# Patient Record
Sex: MALE | Race: WHITE | NOT HISPANIC OR LATINO | URBAN - METROPOLITAN AREA
[De-identification: names, ages, dates, MRNs, and addresses within clinical notes are randomized per-mention and may not be internally consistent; named-entity substitution may affect disease eponyms.]

---

## 2019-10-12 ENCOUNTER — INPATIENT (INPATIENT)
Facility: HOSPITAL | Age: 84
LOS: 1 days | Discharge: HOME | End: 2019-10-14
Attending: INTERNAL MEDICINE | Admitting: INTERNAL MEDICINE
Payer: MEDICARE

## 2019-10-12 VITALS
TEMPERATURE: 98 F | OXYGEN SATURATION: 98 % | RESPIRATION RATE: 18 BRPM | SYSTOLIC BLOOD PRESSURE: 112 MMHG | DIASTOLIC BLOOD PRESSURE: 54 MMHG | HEART RATE: 60 BPM

## 2019-10-12 DIAGNOSIS — Z95.0 PRESENCE OF CARDIAC PACEMAKER: Chronic | ICD-10-CM

## 2019-10-12 DIAGNOSIS — I50.9 HEART FAILURE, UNSPECIFIED: ICD-10-CM

## 2019-10-12 DIAGNOSIS — Z95.0 PRESENCE OF CARDIAC PACEMAKER: ICD-10-CM

## 2019-10-12 DIAGNOSIS — N28.9 DISORDER OF KIDNEY AND URETER, UNSPECIFIED: ICD-10-CM

## 2019-10-12 DIAGNOSIS — I48.91 UNSPECIFIED ATRIAL FIBRILLATION: ICD-10-CM

## 2019-10-12 DIAGNOSIS — I10 ESSENTIAL (PRIMARY) HYPERTENSION: ICD-10-CM

## 2019-10-12 DIAGNOSIS — Z29.9 ENCOUNTER FOR PROPHYLACTIC MEASURES, UNSPECIFIED: ICD-10-CM

## 2019-10-12 LAB
ALBUMIN SERPL ELPH-MCNC: 4.1 G/DL — SIGNIFICANT CHANGE UP (ref 3.3–5)
ALP SERPL-CCNC: 52 U/L — SIGNIFICANT CHANGE UP (ref 40–120)
ALT FLD-CCNC: 44 U/L — HIGH (ref 4–41)
ANION GAP SERPL CALC-SCNC: 15 MMO/L — HIGH (ref 7–14)
APTT BLD: 33.8 SEC — SIGNIFICANT CHANGE UP (ref 27.5–36.3)
AST SERPL-CCNC: 36 U/L — SIGNIFICANT CHANGE UP (ref 4–40)
B PERT DNA SPEC QL NAA+PROBE: NOT DETECTED — SIGNIFICANT CHANGE UP
BASE EXCESS BLDV CALC-SCNC: -1.2 MMOL/L — SIGNIFICANT CHANGE UP
BASOPHILS # BLD AUTO: 0.06 K/UL — SIGNIFICANT CHANGE UP (ref 0–0.2)
BASOPHILS NFR BLD AUTO: 0.4 % — SIGNIFICANT CHANGE UP (ref 0–2)
BILIRUB SERPL-MCNC: 0.5 MG/DL — SIGNIFICANT CHANGE UP (ref 0.2–1.2)
BLOOD GAS VENOUS - CREATININE: 2.15 MG/DL — HIGH (ref 0.5–1.3)
BUN SERPL-MCNC: 41 MG/DL — HIGH (ref 7–23)
C PNEUM DNA SPEC QL NAA+PROBE: NOT DETECTED — SIGNIFICANT CHANGE UP
CALCIUM SERPL-MCNC: 9.1 MG/DL — SIGNIFICANT CHANGE UP (ref 8.4–10.5)
CHLORIDE BLDV-SCNC: 99 MMOL/L — SIGNIFICANT CHANGE UP (ref 96–108)
CHLORIDE SERPL-SCNC: 95 MMOL/L — LOW (ref 98–107)
CO2 SERPL-SCNC: 22 MMOL/L — SIGNIFICANT CHANGE UP (ref 22–31)
CREAT SERPL-MCNC: 2.14 MG/DL — HIGH (ref 0.5–1.3)
EOSINOPHIL # BLD AUTO: 0.35 K/UL — SIGNIFICANT CHANGE UP (ref 0–0.5)
EOSINOPHIL NFR BLD AUTO: 2.5 % — SIGNIFICANT CHANGE UP (ref 0–6)
FLUAV H1 2009 PAND RNA SPEC QL NAA+PROBE: NOT DETECTED — SIGNIFICANT CHANGE UP
FLUAV H1 RNA SPEC QL NAA+PROBE: NOT DETECTED — SIGNIFICANT CHANGE UP
FLUAV H3 RNA SPEC QL NAA+PROBE: NOT DETECTED — SIGNIFICANT CHANGE UP
FLUAV SUBTYP SPEC NAA+PROBE: NOT DETECTED — SIGNIFICANT CHANGE UP
FLUBV RNA SPEC QL NAA+PROBE: NOT DETECTED — SIGNIFICANT CHANGE UP
GAS PNL BLDV: 128 MMOL/L — LOW (ref 136–146)
GLUCOSE BLDV-MCNC: 129 MG/DL — HIGH (ref 70–99)
GLUCOSE SERPL-MCNC: 133 MG/DL — HIGH (ref 70–99)
HADV DNA SPEC QL NAA+PROBE: NOT DETECTED — SIGNIFICANT CHANGE UP
HCO3 BLDV-SCNC: 24 MMOL/L — SIGNIFICANT CHANGE UP (ref 20–27)
HCOV PNL SPEC NAA+PROBE: SIGNIFICANT CHANGE UP
HCT VFR BLD CALC: 29.7 % — LOW (ref 39–50)
HCT VFR BLDV CALC: 31.1 % — LOW (ref 39–51)
HGB BLD-MCNC: 9.7 G/DL — LOW (ref 13–17)
HGB BLDV-MCNC: 10 G/DL — LOW (ref 13–17)
HMPV RNA SPEC QL NAA+PROBE: NOT DETECTED — SIGNIFICANT CHANGE UP
HPIV1 RNA SPEC QL NAA+PROBE: NOT DETECTED — SIGNIFICANT CHANGE UP
HPIV2 RNA SPEC QL NAA+PROBE: NOT DETECTED — SIGNIFICANT CHANGE UP
HPIV3 RNA SPEC QL NAA+PROBE: NOT DETECTED — SIGNIFICANT CHANGE UP
HPIV4 RNA SPEC QL NAA+PROBE: NOT DETECTED — SIGNIFICANT CHANGE UP
IMM GRANULOCYTES NFR BLD AUTO: 0.7 % — SIGNIFICANT CHANGE UP (ref 0–1.5)
INR BLD: 1.84 — HIGH (ref 0.88–1.17)
LACTATE BLDV-MCNC: 1.7 MMOL/L — SIGNIFICANT CHANGE UP (ref 0.5–2)
LYMPHOCYTES # BLD AUTO: 16.1 % — SIGNIFICANT CHANGE UP (ref 13–44)
LYMPHOCYTES # BLD AUTO: 2.23 K/UL — SIGNIFICANT CHANGE UP (ref 1–3.3)
MCHC RBC-ENTMCNC: 28.6 PG — SIGNIFICANT CHANGE UP (ref 27–34)
MCHC RBC-ENTMCNC: 32.7 % — SIGNIFICANT CHANGE UP (ref 32–36)
MCV RBC AUTO: 87.6 FL — SIGNIFICANT CHANGE UP (ref 80–100)
MONOCYTES # BLD AUTO: 1.78 K/UL — HIGH (ref 0–0.9)
MONOCYTES NFR BLD AUTO: 12.9 % — SIGNIFICANT CHANGE UP (ref 2–14)
NEUTROPHILS # BLD AUTO: 9.33 K/UL — HIGH (ref 1.8–7.4)
NEUTROPHILS NFR BLD AUTO: 67.4 % — SIGNIFICANT CHANGE UP (ref 43–77)
NRBC # FLD: 0 K/UL — SIGNIFICANT CHANGE UP (ref 0–0)
NT-PROBNP SERPL-SCNC: 1397 PG/ML — SIGNIFICANT CHANGE UP
PCO2 BLDV: 36 MMHG — LOW (ref 41–51)
PH BLDV: 7.42 PH — SIGNIFICANT CHANGE UP (ref 7.32–7.43)
PLATELET # BLD AUTO: 260 K/UL — SIGNIFICANT CHANGE UP (ref 150–400)
PMV BLD: 10.6 FL — SIGNIFICANT CHANGE UP (ref 7–13)
PO2 BLDV: 105 MMHG — HIGH (ref 35–40)
POTASSIUM BLDV-SCNC: 4 MMOL/L — SIGNIFICANT CHANGE UP (ref 3.4–4.5)
POTASSIUM SERPL-MCNC: 4.4 MMOL/L — SIGNIFICANT CHANGE UP (ref 3.5–5.3)
POTASSIUM SERPL-SCNC: 4.4 MMOL/L — SIGNIFICANT CHANGE UP (ref 3.5–5.3)
PROT SERPL-MCNC: 7.1 G/DL — SIGNIFICANT CHANGE UP (ref 6–8.3)
PROTHROM AB SERPL-ACNC: 20.8 SEC — HIGH (ref 9.8–13.1)
RBC # BLD: 3.39 M/UL — LOW (ref 4.2–5.8)
RBC # FLD: 13.8 % — SIGNIFICANT CHANGE UP (ref 10.3–14.5)
RSV RNA SPEC QL NAA+PROBE: NOT DETECTED — SIGNIFICANT CHANGE UP
RV+EV RNA SPEC QL NAA+PROBE: DETECTED — HIGH
SAO2 % BLDV: 98.6 % — HIGH (ref 60–85)
SODIUM SERPL-SCNC: 132 MMOL/L — LOW (ref 135–145)
TROPONIN T, HIGH SENSITIVITY: 31 NG/L — SIGNIFICANT CHANGE UP (ref ?–14)
TROPONIN T, HIGH SENSITIVITY: 37 NG/L — SIGNIFICANT CHANGE UP (ref ?–14)
WBC # BLD: 13.84 K/UL — HIGH (ref 3.8–10.5)
WBC # FLD AUTO: 13.84 K/UL — HIGH (ref 3.8–10.5)

## 2019-10-12 PROCEDURE — 71046 X-RAY EXAM CHEST 2 VIEWS: CPT | Mod: 26

## 2019-10-12 RX ORDER — TRAZODONE HCL 50 MG
50 TABLET ORAL AT BEDTIME
Refills: 0 | Status: DISCONTINUED | OUTPATIENT
Start: 2019-10-12 | End: 2019-10-14

## 2019-10-12 RX ORDER — FUROSEMIDE 40 MG
40 TABLET ORAL ONCE
Refills: 0 | Status: COMPLETED | OUTPATIENT
Start: 2019-10-12 | End: 2019-10-12

## 2019-10-12 RX ORDER — ASCORBIC ACID 60 MG
500 TABLET,CHEWABLE ORAL DAILY
Refills: 0 | Status: DISCONTINUED | OUTPATIENT
Start: 2019-10-12 | End: 2019-10-14

## 2019-10-12 RX ORDER — FUROSEMIDE 40 MG
20 TABLET ORAL
Refills: 0 | Status: DISCONTINUED | OUTPATIENT
Start: 2019-10-12 | End: 2019-10-14

## 2019-10-12 RX ORDER — RIVAROXABAN 15 MG-20MG
15 KIT ORAL DAILY
Refills: 0 | Status: DISCONTINUED | OUTPATIENT
Start: 2019-10-12 | End: 2019-10-14

## 2019-10-12 RX ORDER — FUROSEMIDE 40 MG
40 TABLET ORAL
Refills: 0 | Status: DISCONTINUED | OUTPATIENT
Start: 2019-10-12 | End: 2019-10-12

## 2019-10-12 RX ORDER — METOPROLOL TARTRATE 50 MG
50 TABLET ORAL DAILY
Refills: 0 | Status: DISCONTINUED | OUTPATIENT
Start: 2019-10-12 | End: 2019-10-14

## 2019-10-12 RX ORDER — HEPARIN SODIUM 5000 [USP'U]/ML
5000 INJECTION INTRAVENOUS; SUBCUTANEOUS
Refills: 0 | Status: DISCONTINUED | OUTPATIENT
Start: 2019-10-12 | End: 2019-10-12

## 2019-10-12 RX ADMIN — Medication 40 MILLIGRAM(S): at 13:09

## 2019-10-12 RX ADMIN — RIVAROXABAN 15 MILLIGRAM(S): KIT at 17:39

## 2019-10-12 RX ADMIN — Medication 50 MILLIGRAM(S): at 22:01

## 2019-10-12 RX ADMIN — Medication 20 MILLIGRAM(S): at 17:39

## 2019-10-12 NOTE — ED PROVIDER NOTE - CLINICAL SUMMARY MEDICAL DECISION MAKING FREE TEXT BOX
89yoM with HTN, CHF, PPM, Afib (Xarelto) p/w increased sputum production. May be viral vs worsening CHF  - CBC/CMP/BNP/EKG/Trop/VBG  - CXR

## 2019-10-12 NOTE — ED ADULT NURSE REASSESSMENT NOTE - NS ED NURSE REASSESS COMMENT FT1
Rpt given to essu LOS White,  pt in nad, pt tolerating PO intake, breathing even and unlabored, denies any present pain.

## 2019-10-12 NOTE — ED PROVIDER NOTE - OBJECTIVE STATEMENT
89yoM PMHx HTN, CHF, PPM, Afib (Xarelto) p/w increased sputum production for 3 days. Patient has been coughing phlegm and it has progressively got worse. Reports low grade fevers at home otherwise afebrile now. Sputum was thicker, clear in color but has loosened up recently. Also c/o mild SOB as well as increase in leg swelling. Pt was dx with CHF "recently" and most recent ECHO appx >1yr. Not on any diuretic. Denies any headaches, changes in vision, chills, N/V, CP, abdominal pain, constipation, diarrhea or dysuria. 89yoM PMHx HTN, CHF, PPM, Afib (Xarelto) p/w increased sputum production for 3 days. Patient has been coughing phlegm and it has progressively got worse. Sputum was thicker, clear in color but has loosened up recently. Also c/o mild SOB as well as increase in leg swelling. Pt was dx with CHF "recently" and most recent ECHO appx >1yr. Not on any diuretic. Denies any headaches, changes in vision, chills, N/V, CP, abdominal pain, constipation, diarrhea or dysuria.

## 2019-10-12 NOTE — ED PROVIDER NOTE - PMH
Atrial fibrillation, unspecified type    Congestive heart failure, unspecified HF chronicity, unspecified heart failure type    Essential hypertension

## 2019-10-12 NOTE — H&P ADULT - PROBLEM SELECTOR PLAN 3
Risk Factor                                                        Points    [ x] Congestive Heart Failure			1  [ x] Hypertension					1  [ x] Age = 75y					2  [ ] Age 65-74y					1  [ ] Diabetes Mellitus				1  [ ] Stroke/TIA (ie, thromboembolic)		2  [ ] Vascular Disease (MI/PAD/aortic plaque)	1  [ ] Sex Category (ie, female sex)			1  Total Score = 4  On xarelto and metoprolol for rate control

## 2019-10-12 NOTE — H&P ADULT - NSICDXPASTMEDICALHX_GEN_ALL_CORE_FT
PAST MEDICAL HISTORY:  Atrial fibrillation, unspecified type     Congestive heart failure, unspecified HF chronicity, unspecified heart failure type     Essential hypertension

## 2019-10-12 NOTE — ED PROVIDER NOTE - CARE PLAN
Principal Discharge DX:	Congestive heart failure, unspecified HF chronicity, unspecified heart failure type  Goal:	Improve overall symptoms  Assessment and plan of treatment:	Patient most likely has worsening CHF  - Lasix  - Admit to Tele Doc. Principal Discharge DX:	Congestive heart failure, unspecified HF chronicity, unspecified heart failure type  Goal:	Improve overall symptoms  Assessment and plan of treatment:	Patient most likely has worsening CHF  - Lasix  - Admit to Tele Doc.  Secondary Diagnosis:	Renal insufficiency

## 2019-10-12 NOTE — H&P ADULT - HISTORY OF PRESENT ILLNESS
89 yo M HTN, CHF, PPM, afib on xarelto c/o  cough w/sputum, SOB, LE swelling with CHF exacerbation. Pt c/o ALFONSO ( walks 1 block), cough with clear sputum, increased LEc swelling x 3 days. NO chest pain, SOB at rest  PND, orthopnea, palpitations, diaphoresis, lightheadedness, dizziness, syncope, fever chills, malaise, myalgias, anorexia, weight changes ( loss or gain), night weats, generalized fatigue abdominal pain, N/V/C/D BRBPR, melena, urinary symptoms,  and wheezing. Pt recently told by cardiologist that he has a diagnosis of CHF.

## 2019-10-12 NOTE — H&P ADULT - PROBLEM SELECTOR PLAN 1
CHF exacerbation on IV lasix 40 BID  ALU=1782 IA=895  CXR: AGUSTIN  Trop=37--> 31  Ekg AV paced @ 65 bpm Q V1- V6.  Case dw Dr Peterson will continue Iv lasix 40 bid for now and consider decrease in am  Check TTE CHF exacerbation on IV lasix 40 BID  CFV=3504 NR=649  CXR: AGUSTIN  Trop=37--> 31  Ekg AV paced @ 65 bpm Q V1- V6.  Case dw Dr Peterson will continue Iv lasix 20 bid for now and consider decrease in am  Check TTE

## 2019-10-12 NOTE — ED ADULT NURSE NOTE - OBJECTIVE STATEMENT
Pt a&ox3 c/o cough, fevers, weakness over the past week, breathing even and unlabored, denies sob, no cp/discomfort, no headache/dizziness, abd soft, non-tender, non-distended, no n/v/d, on xarelto for history of afib. IVL placed, labs sent, md at bedside, will continue to monitor.

## 2019-10-12 NOTE — ED PROVIDER NOTE - CARDIAC, MLM
Normal rate, regular rhythm.  Heart sounds S1, S2.  No murmurs, rubs or gallops. Normal rate, regular rhythm.  Heart sounds S1, S2.  No murmurs, rubs or gallops. 2+ pitting edema bl

## 2019-10-12 NOTE — H&P ADULT - GASTROINTESTINAL DETAILS
no organomegaly/no distention/no bruit/no rebound tenderness/normal/bowel sounds normal/no masses palpable/no rigidity/soft/nontender

## 2019-10-12 NOTE — ED PROVIDER NOTE - ATTENDING CONTRIBUTION TO CARE
Shalonda: 89yo male with a h/o HTN, CHF, PPM, and Afib on xarelto c/o one week of progressively worsening b/l LE edema and SOB. Pt also c/o productive cough with clear phlegm. No fevers or chills. NO LE pain. No chest pain or abdominal pain. NO nausea or vomiting. Pt saw cards outpatient several days ago for the edema but was not started on a diuretic. However, when he began having SOB he decided to come to the ED. Exam: GENERAL: chronically ill appearing, NAD, HEENT: MMM, PERRLA, CARDIO: +S1/S2, no murmurs, rubs or gallops, LUNGS: + rales and rhonchi at b/l bases, mild tachypnea with speaking, no retractions or accessory muscle use. ABD: soft, nontender, BSx4 quadrants, no guarding or rigidity. EXT: 3+ b/l LE edema, no calf TTP, NVI distally NEURO: AxOx3, SKIN: no rashes or lesions, well perfused A/P- 89 yo male with likely CHF exacerbation. NO nasal congestion or fevers. will obtain cbc, cmp, troponin, bnp, CXR, EKG and reassess.

## 2019-10-12 NOTE — H&P ADULT - RS GEN PE MLT RESP DETAILS PC
no chest wall tenderness/respirations non-labored/rales/good air movement/breath sounds equal/no intercostal retractions/normal

## 2019-10-12 NOTE — ED ADULT TRIAGE NOTE - CHIEF COMPLAINT QUOTE
Pt c/o productive cough, weakness, and fevers at home x 1 week. PMH of HTN, CHF, PPM, Afib on Xarelto. Pt states that [facility] and told [facility] was anemic. Denies bleeding, denies history of blood transfusions. Pt states he was also told his WBC and platelets were low. Denies chest pain, denies SOB.

## 2019-10-13 ENCOUNTER — TRANSCRIPTION ENCOUNTER (OUTPATIENT)
Age: 84
End: 2019-10-13

## 2019-10-13 LAB
ANION GAP SERPL CALC-SCNC: 18 MMO/L — HIGH (ref 7–14)
APPEARANCE UR: CLEAR — SIGNIFICANT CHANGE UP
BASOPHILS # BLD AUTO: 0.06 K/UL — SIGNIFICANT CHANGE UP (ref 0–0.2)
BASOPHILS NFR BLD AUTO: 0.5 % — SIGNIFICANT CHANGE UP (ref 0–2)
BILIRUB UR-MCNC: NEGATIVE — SIGNIFICANT CHANGE UP
BLOOD UR QL VISUAL: NEGATIVE — SIGNIFICANT CHANGE UP
BUN SERPL-MCNC: 47 MG/DL — HIGH (ref 7–23)
CALCIUM SERPL-MCNC: 9.3 MG/DL — SIGNIFICANT CHANGE UP (ref 8.4–10.5)
CHLORIDE SERPL-SCNC: 89 MMOL/L — LOW (ref 98–107)
CHOLEST SERPL-MCNC: 86 MG/DL — LOW (ref 120–199)
CO2 SERPL-SCNC: 25 MMOL/L — SIGNIFICANT CHANGE UP (ref 22–31)
COLOR SPEC: SIGNIFICANT CHANGE UP
CREAT ?TM UR-MCNC: 60.3 MG/DL — SIGNIFICANT CHANGE UP
CREAT SERPL-MCNC: 2.16 MG/DL — HIGH (ref 0.5–1.3)
EOSINOPHIL # BLD AUTO: 0.33 K/UL — SIGNIFICANT CHANGE UP (ref 0–0.5)
EOSINOPHIL NFR BLD AUTO: 2.9 % — SIGNIFICANT CHANGE UP (ref 0–6)
GLUCOSE SERPL-MCNC: 129 MG/DL — HIGH (ref 70–99)
GLUCOSE UR-MCNC: NEGATIVE — SIGNIFICANT CHANGE UP
HBA1C BLD-MCNC: 5.9 % — HIGH (ref 4–5.6)
HCT VFR BLD CALC: 31.1 % — LOW (ref 39–50)
HDLC SERPL-MCNC: 49 MG/DL — SIGNIFICANT CHANGE UP (ref 35–55)
HGB BLD-MCNC: 10.1 G/DL — LOW (ref 13–17)
IMM GRANULOCYTES NFR BLD AUTO: 0.8 % — SIGNIFICANT CHANGE UP (ref 0–1.5)
KETONES UR-MCNC: NEGATIVE — SIGNIFICANT CHANGE UP
LEUKOCYTE ESTERASE UR-ACNC: NEGATIVE — SIGNIFICANT CHANGE UP
LIPID PNL WITH DIRECT LDL SERPL: 33 MG/DL — SIGNIFICANT CHANGE UP
LYMPHOCYTES # BLD AUTO: 18.8 % — SIGNIFICANT CHANGE UP (ref 13–44)
LYMPHOCYTES # BLD AUTO: 2.15 K/UL — SIGNIFICANT CHANGE UP (ref 1–3.3)
MAGNESIUM SERPL-MCNC: 1.9 MG/DL — SIGNIFICANT CHANGE UP (ref 1.6–2.6)
MCHC RBC-ENTMCNC: 28.5 PG — SIGNIFICANT CHANGE UP (ref 27–34)
MCHC RBC-ENTMCNC: 32.5 % — SIGNIFICANT CHANGE UP (ref 32–36)
MCV RBC AUTO: 87.6 FL — SIGNIFICANT CHANGE UP (ref 80–100)
MONOCYTES # BLD AUTO: 1.45 K/UL — HIGH (ref 0–0.9)
MONOCYTES NFR BLD AUTO: 12.7 % — SIGNIFICANT CHANGE UP (ref 2–14)
NEUTROPHILS # BLD AUTO: 7.33 K/UL — SIGNIFICANT CHANGE UP (ref 1.8–7.4)
NEUTROPHILS NFR BLD AUTO: 64.3 % — SIGNIFICANT CHANGE UP (ref 43–77)
NITRITE UR-MCNC: NEGATIVE — SIGNIFICANT CHANGE UP
NRBC # FLD: 0 K/UL — SIGNIFICANT CHANGE UP (ref 0–0)
PH UR: 6 — SIGNIFICANT CHANGE UP (ref 5–8)
PHOSPHATE SERPL-MCNC: 4.1 MG/DL — SIGNIFICANT CHANGE UP (ref 2.5–4.5)
PLATELET # BLD AUTO: 263 K/UL — SIGNIFICANT CHANGE UP (ref 150–400)
PMV BLD: 11.1 FL — SIGNIFICANT CHANGE UP (ref 7–13)
POTASSIUM SERPL-MCNC: 3.6 MMOL/L — SIGNIFICANT CHANGE UP (ref 3.5–5.3)
POTASSIUM SERPL-SCNC: 3.6 MMOL/L — SIGNIFICANT CHANGE UP (ref 3.5–5.3)
PROT UR-MCNC: 5.9 MG/DL — SIGNIFICANT CHANGE UP
PROT UR-MCNC: NEGATIVE — SIGNIFICANT CHANGE UP
RBC # BLD: 3.55 M/UL — LOW (ref 4.2–5.8)
RBC # FLD: 13.9 % — SIGNIFICANT CHANGE UP (ref 10.3–14.5)
SODIUM SERPL-SCNC: 132 MMOL/L — LOW (ref 135–145)
SP GR SPEC: 1.01 — SIGNIFICANT CHANGE UP (ref 1–1.04)
TRIGL SERPL-MCNC: 77 MG/DL — SIGNIFICANT CHANGE UP (ref 10–149)
TSH SERPL-MCNC: 1.95 UIU/ML — SIGNIFICANT CHANGE UP (ref 0.27–4.2)
UROBILINOGEN FLD QL: NORMAL — SIGNIFICANT CHANGE UP
WBC # BLD: 11.41 K/UL — HIGH (ref 3.8–10.5)
WBC # FLD AUTO: 11.41 K/UL — HIGH (ref 3.8–10.5)

## 2019-10-13 PROCEDURE — 93306 TTE W/DOPPLER COMPLETE: CPT | Mod: 26

## 2019-10-13 RX ADMIN — Medication 20 MILLIGRAM(S): at 05:37

## 2019-10-13 RX ADMIN — Medication 100 MILLIGRAM(S): at 13:14

## 2019-10-13 RX ADMIN — Medication 50 MILLIGRAM(S): at 05:37

## 2019-10-13 RX ADMIN — RIVAROXABAN 15 MILLIGRAM(S): KIT at 13:14

## 2019-10-13 RX ADMIN — Medication 500 MILLIGRAM(S): at 13:14

## 2019-10-13 RX ADMIN — Medication 100 MILLIGRAM(S): at 16:56

## 2019-10-13 RX ADMIN — Medication 20 MILLIGRAM(S): at 17:14

## 2019-10-13 RX ADMIN — Medication 50 MILLIGRAM(S): at 21:52

## 2019-10-13 RX ADMIN — Medication 100 MILLIGRAM(S): at 21:53

## 2019-10-13 NOTE — DISCHARGE NOTE PROVIDER - PROVIDER TOKENS
FREE:[LAST:[Primary care at [facility]],PHONE:[(   )    -],FAX:[(   )    -]] FREE:[LAST:[Primary care at [facility]],PHONE:[(   )    -],FAX:[(   )    -]],PROVIDER:[TOKEN:[[id number]]]

## 2019-10-13 NOTE — DISCHARGE NOTE PROVIDER - CARE PROVIDERS DIRECT ADDRESSES
,DirectAddress_Unknown ,DirectAddress_Unknown,[email or web address]

## 2019-10-13 NOTE — PHYSICAL THERAPY INITIAL EVALUATION ADULT - PERTINENT HX OF CURRENT PROBLEM, REHAB EVAL
Patient is 89 year old male admitted with history of HTN, CHF, PPM, AFIB, presents with LE swelling and shortness of breath.

## 2019-10-13 NOTE — DISCHARGE NOTE PROVIDER - CARE PROVIDER_API CALL
Primary care at [facility],   Phone: (   )    -  Fax: (   )    -  Follow Up Time: Primary care at [facility],   Phone: (   )    -  Fax: (   )    -  Follow Up Time:     Paulino Nieves (MD)  Internal Medicine; Nephrology  [address] [place] [address]  Phone: [phone]  Fax: [phone]  Follow Up Time:

## 2019-10-13 NOTE — DISCHARGE NOTE PROVIDER - HOSPITAL COURSE
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    - lasix     - TTE EF 64%        DAVID vs CKD    - Hold lisinopril        Anemia    H/H stable        Afib    - xarelto    - CHADSVasc=4        HTN    - metoprolol         PT eval: home no needs 89 yo M HTN, CHF, PPM, afib on xarelto c/o  cough w/sputum, SOB, LE swelling with CHF excerbation        + CHF exacerbation on IV lasix    + Enterovirus infection    + DAVID on CKD        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

## 2019-10-13 NOTE — DISCHARGE NOTE PROVIDER - NSDCCPCAREPLAN_GEN_ALL_CORE_FT
PRINCIPAL DISCHARGE DIAGNOSIS  Diagnosis: Congestive heart failure, unspecified HF chronicity, unspecified heart failure type  Assessment and Plan of Treatment:       SECONDARY DISCHARGE DIAGNOSES  Diagnosis: Renal insufficiency  Assessment and Plan of Treatment: PRINCIPAL DISCHARGE DIAGNOSIS  Diagnosis: Congestive heart failure, unspecified HF chronicity, unspecified heart failure type  Assessment and Plan of Treatment: Your chlorthalidone was discontinued. You are now on lasix 40 mg daily. Prescription sent to [facility] at [address] [place], [place] [id number]. Monitor and record your weight daily. Follow up with your cardiologist in 1 week, please call to make an appointment.      SECONDARY DISCHARGE DIAGNOSES  Diagnosis: Atrial fibrillation, unspecified type  Assessment and Plan of Treatment: Continue xarelto    Diagnosis: Essential hypertension  Assessment and Plan of Treatment: Your lisinopril was held due to abnormal kidney function. Please follow up with your cardiologist and the kidney doctor if this medication needs to be restarted.    Diagnosis: Renal insufficiency  Assessment and Plan of Treatment: Your kidney ultrasound is normal. Your creatinine on discharge is 2.10. Please follow up with Dr Nieves in 1 week for further care, call to make an appointment. Let them know you have been in the hospital. Your lisinopril was held due to abnormal kidney function. Follow up with the kidney doctor and your cardiologist if lisinopril can be resumed.

## 2019-10-14 ENCOUNTER — TRANSCRIPTION ENCOUNTER (OUTPATIENT)
Age: 84
End: 2019-10-14

## 2019-10-14 VITALS
OXYGEN SATURATION: 99 % | HEART RATE: 59 BPM | RESPIRATION RATE: 16 BRPM | SYSTOLIC BLOOD PRESSURE: 110 MMHG | TEMPERATURE: 98 F | DIASTOLIC BLOOD PRESSURE: 50 MMHG

## 2019-10-14 LAB
ANION GAP SERPL CALC-SCNC: 14 MMO/L — SIGNIFICANT CHANGE UP (ref 7–14)
APPEARANCE UR: CLEAR — SIGNIFICANT CHANGE UP
BASOPHILS # BLD AUTO: 0.05 K/UL — SIGNIFICANT CHANGE UP (ref 0–0.2)
BASOPHILS NFR BLD AUTO: 0.5 % — SIGNIFICANT CHANGE UP (ref 0–2)
BILIRUB UR-MCNC: NEGATIVE — SIGNIFICANT CHANGE UP
BLOOD UR QL VISUAL: NEGATIVE — SIGNIFICANT CHANGE UP
BUN SERPL-MCNC: 49 MG/DL — HIGH (ref 7–23)
CALCIUM SERPL-MCNC: 8.9 MG/DL — SIGNIFICANT CHANGE UP (ref 8.4–10.5)
CHLORIDE SERPL-SCNC: 91 MMOL/L — LOW (ref 98–107)
CO2 SERPL-SCNC: 25 MMOL/L — SIGNIFICANT CHANGE UP (ref 22–31)
COLOR SPEC: COLORLESS — SIGNIFICANT CHANGE UP
CREAT ?TM UR-MCNC: 24.8 MG/DL — SIGNIFICANT CHANGE UP
CREAT SERPL-MCNC: 2.1 MG/DL — HIGH (ref 0.5–1.3)
EOSINOPHIL # BLD AUTO: 0.33 K/UL — SIGNIFICANT CHANGE UP (ref 0–0.5)
EOSINOPHIL NFR BLD AUTO: 3.2 % — SIGNIFICANT CHANGE UP (ref 0–6)
GLUCOSE SERPL-MCNC: 118 MG/DL — HIGH (ref 70–99)
GLUCOSE UR-MCNC: NEGATIVE — SIGNIFICANT CHANGE UP
HCT VFR BLD CALC: 31.3 % — LOW (ref 39–50)
HGB BLD-MCNC: 10 G/DL — LOW (ref 13–17)
IMM GRANULOCYTES NFR BLD AUTO: 0.7 % — SIGNIFICANT CHANGE UP (ref 0–1.5)
KETONES UR-MCNC: NEGATIVE — SIGNIFICANT CHANGE UP
LEUKOCYTE ESTERASE UR-ACNC: NEGATIVE — SIGNIFICANT CHANGE UP
LYMPHOCYTES # BLD AUTO: 2.24 K/UL — SIGNIFICANT CHANGE UP (ref 1–3.3)
LYMPHOCYTES # BLD AUTO: 21.4 % — SIGNIFICANT CHANGE UP (ref 13–44)
MAGNESIUM SERPL-MCNC: 1.8 MG/DL — SIGNIFICANT CHANGE UP (ref 1.6–2.6)
MCHC RBC-ENTMCNC: 28 PG — SIGNIFICANT CHANGE UP (ref 27–34)
MCHC RBC-ENTMCNC: 31.9 % — LOW (ref 32–36)
MCV RBC AUTO: 87.7 FL — SIGNIFICANT CHANGE UP (ref 80–100)
MONOCYTES # BLD AUTO: 1.29 K/UL — HIGH (ref 0–0.9)
MONOCYTES NFR BLD AUTO: 12.3 % — SIGNIFICANT CHANGE UP (ref 2–14)
NEUTROPHILS # BLD AUTO: 6.47 K/UL — SIGNIFICANT CHANGE UP (ref 1.8–7.4)
NEUTROPHILS NFR BLD AUTO: 61.9 % — SIGNIFICANT CHANGE UP (ref 43–77)
NITRITE UR-MCNC: NEGATIVE — SIGNIFICANT CHANGE UP
NRBC # FLD: 0 K/UL — SIGNIFICANT CHANGE UP (ref 0–0)
PH UR: 6.5 — SIGNIFICANT CHANGE UP (ref 5–8)
PHOSPHATE SERPL-MCNC: 3.7 MG/DL — SIGNIFICANT CHANGE UP (ref 2.5–4.5)
PLATELET # BLD AUTO: 266 K/UL — SIGNIFICANT CHANGE UP (ref 150–400)
PMV BLD: 11.2 FL — SIGNIFICANT CHANGE UP (ref 7–13)
POTASSIUM SERPL-MCNC: 3.3 MMOL/L — LOW (ref 3.5–5.3)
POTASSIUM SERPL-SCNC: 3.3 MMOL/L — LOW (ref 3.5–5.3)
PROT UR-MCNC: < 4 MG/DL — SIGNIFICANT CHANGE UP
PROT UR-MCNC: NEGATIVE — SIGNIFICANT CHANGE UP
RBC # BLD: 3.57 M/UL — LOW (ref 4.2–5.8)
RBC # FLD: 13.6 % — SIGNIFICANT CHANGE UP (ref 10.3–14.5)
SODIUM SERPL-SCNC: 130 MMOL/L — LOW (ref 135–145)
SP GR SPEC: 1.01 — SIGNIFICANT CHANGE UP (ref 1–1.04)
UROBILINOGEN FLD QL: NORMAL — SIGNIFICANT CHANGE UP
WBC # BLD: 10.45 K/UL — SIGNIFICANT CHANGE UP (ref 3.8–10.5)
WBC # FLD AUTO: 10.45 K/UL — SIGNIFICANT CHANGE UP (ref 3.8–10.5)

## 2019-10-14 PROCEDURE — 76770 US EXAM ABDO BACK WALL COMP: CPT | Mod: 26

## 2019-10-14 RX ORDER — FUROSEMIDE 40 MG
1 TABLET ORAL
Qty: 30 | Refills: 0
Start: 2019-10-14 | End: 2019-11-12

## 2019-10-14 RX ORDER — LISINOPRIL 2.5 MG/1
1 TABLET ORAL
Qty: 0 | Refills: 0 | DISCHARGE

## 2019-10-14 RX ORDER — CHLORTHALIDONE 50 MG
1 TABLET ORAL
Qty: 0 | Refills: 0 | DISCHARGE

## 2019-10-14 RX ORDER — POTASSIUM CHLORIDE 20 MEQ
20 PACKET (EA) ORAL ONCE
Refills: 0 | Status: COMPLETED | OUTPATIENT
Start: 2019-10-14 | End: 2019-10-14

## 2019-10-14 RX ADMIN — Medication 20 MILLIGRAM(S): at 05:20

## 2019-10-14 RX ADMIN — Medication 20 MILLIEQUIVALENT(S): at 11:41

## 2019-10-14 RX ADMIN — Medication 500 MILLIGRAM(S): at 11:41

## 2019-10-14 RX ADMIN — Medication 50 MILLIGRAM(S): at 05:20

## 2019-10-14 RX ADMIN — RIVAROXABAN 15 MILLIGRAM(S): KIT at 11:41

## 2019-10-14 NOTE — PROGRESS NOTE ADULT - SUBJECTIVE AND OBJECTIVE BOX
[facility]   MD BETH DIXON, DO SONNY RYAN, AJIT JOHNSON    TEL:  OFFICE: [phone]  DR TALAMANTES CELL: [phone]  DR. CUELLAR CELL: [phone]  DR. RYAN CELL: [phone]  EMMANUEL CASTRO CELL: [phone]        Patient is a 89y old  Male who presents with a chief complaint of     Patient seen and examined at bedside. No chest pain/sob    VITALS:  T(F): 97.8 (10-14-19 @ 05:18), Max: 98.3 (10-13-19 @ 21:51)  HR: 60 (10-14-19 @ 05:18)  BP: 125/57 (10-14-19 @ 05:18)  RR: 16 (10-14-19 @ 05:18)  SpO2: 97% (10-14-19 @ 05:18)  Wt(kg): --    10-13 @ 07:01  -  10-14 @ 07:00  --------------------------------------------------------  IN: 1250 mL / OUT: 2750 mL / NET: -1500 mL    10-14 @ 07:01  -  10-14 @ 13:28  --------------------------------------------------------  IN: 360 mL / OUT: 600 mL / NET: -240 mL          PHYSICAL EXAM:  Constitutional: NAD  Neck: No JVD  Respiratory: poor air entry  Cardiovascular: S1, S2, RRR  Gastrointestinal: BS+, soft, NT/ND  Extremities: trace peripheral edema    Hospital Medications:   MEDICATIONS  (STANDING):  ascorbic acid 500 milliGRAM(s) Oral daily  furosemide   Injectable 20 milliGRAM(s) IV Push two times a day  metoprolol succinate ER 50 milliGRAM(s) Oral daily  rivaroxaban 15 milliGRAM(s) Oral daily  traZODone 50 milliGRAM(s) Oral at bedtime      LABS:  10-14    130<L>  |  91<L>  |  49<H>  ----------------------------<  118<H>  3.3<L>   |  25  |  2.10<H>    Ca    8.9      14 Oct 2019 06:30  Phos  3.7     10-14  Mg     1.8     10-14      Creatinine Trend: 2.10 <--, 2.16 <--, 2.14 <--    Phosphorus Level, Serum: 3.7 mg/dL (10-14 @ 06:30)                              10.0   10.45 )-----------( 266      ( 14 Oct 2019 06:30 )             31.3     Urine Studies:  Urinalysis - [10-14-19 @ 08:30]      Color COLORLESS / Appearance CLEAR / SG 1.008 / pH 6.5      Gluc NEGATIVE / Ketone NEGATIVE  / Bili NEGATIVE / Urobili NORMAL       Blood NEGATIVE / Protein NEGATIVE / Leuk Est NEGATIVE / Nitrite NEGATIVE      RBC  / WBC  / Hyaline  / Gran  / Sq Epi  / Non Sq Epi  / Bacteria     Urine Creatinine 24.80      [10-14-19 @ 08:30]  Urine Protein < 4      [10-14-19 @ 08:30]    HbA1c 5.9      [10-13-19 @ 06:50]  TSH 1.95      [10-13-19 @ 06:50]  Lipid: chol 86, TG 77, HDL 49, LDL 33      [10-13-19 @ 06:50]        RADIOLOGY & ADDITIONAL STUDIES:

## 2019-10-14 NOTE — DIETITIAN INITIAL EVALUATION ADULT. - PERTINENT MEDS FT
MEDICATIONS  (STANDING):  ascorbic acid 500 milliGRAM(s) Oral daily  furosemide   Injectable 20 milliGRAM(s) IV Push two times a day  metoprolol succinate ER 50 milliGRAM(s) Oral daily  rivaroxaban 15 milliGRAM(s) Oral daily  traZODone 50 milliGRAM(s) Oral at bedtime

## 2019-10-14 NOTE — DIETITIAN INITIAL EVALUATION ADULT. - ADD RECOMMEND
Please Encourage po intake, assist with meals and menu selections, provide alternatives PRN. Monitor weights, labs, BM's, skin integrity, p.o. intake.

## 2019-10-14 NOTE — DIETITIAN INITIAL EVALUATION ADULT. - OTHER INFO
Nutrition consult received for RD-heart failure linked order set. Pt is a 89 year old male with a past medical history inclusive of HTN, CHF, PPM, afib on xarelto c/o  cough w/sputum, SOB, LE swelling a/w CHF exacerbation, DAVID on CKD and enterovirus infection.  Patient reports good PO intake at present and prior to admission. Patient denies any nausea/vomiting/diarrhea/constipation or difficulty chewing and swallowing. The Pt was provided with Heart Healthy diet education, which he verbalized comprehension of.

## 2019-10-14 NOTE — DIETITIAN INITIAL EVALUATION ADULT. - PERTINENT LABORATORY DATA
10-14 Na130 mmol/L<L> Glu 118 mg/dL<H> K+ 3.3 mmol/L<L> Cr  2.10 mg/dL<H> BUN 49 mg/dL<H> 10-14 Phos 3.7 mg/dL 10-12 Alb 4.1 g/dL 10-13 [id number] 5.9 %<H> 10-13 Chol 86 mg/dL<L> LDL 33 mg/dL HDL 49 mg/dL Trig 77 mg/dL

## 2019-10-14 NOTE — CONSULT NOTE ADULT - SUBJECTIVE AND OBJECTIVE BOX
Patient is a 89y old  Male who presents with a chief complaint of SOB for few weeks    HPI:  89 yo M HTN, CHF, PPM, afib on xarelto c/o  cough w/sputum, SOB, LE swelling with CHF exacerbation. Pt c/o ALFONSO ( walks 1 block), cough with clear sputum, increased LEc swelling x 3 days. NO chest pain, SOB at rest  PND, orthopnea, palpitations, diaphoresis, lightheadedness, dizziness, syncope, fever chills, malaise, myalgias, anorexia, weight changes ( loss or gain), night weats, generalized fatigue abdominal pain, N/V/C/D BRBPR, melena, urinary symptoms,  and wheezing. Pt recently told by cardiologist that he has a diagnosis of CHF. (12 Oct 2019 14:14)      PAST MEDICAL & SURGICAL HISTORY:  Atrial fibrillation, unspecified type  Congestive heart failure, unspecified HF chronicity, unspecified heart failure type  Essential hypertension  Artificial cardiac pacemaker      Review of Systems:   CONSTITUTIONAL: No fever,  or fatigue  NECK: No pain or stiffness  RESPIRATORY: No cough, wheezing, chills or hemoptysis; No shortness of breath  CARDIOVASCULAR: No chest pain, palpitations, dizziness, or leg swelling  GASTROINTESTINAL: No abdominal or epigastric pain. No nausea, vomiting, or hematemesis; No diarrhea or constipation.   NEUROLOGICAL: No headaches,           Allergies    No Known Allergies    Intolerances        Social History:     FAMILY HISTORY:  No pertinent family history in first degree relatives      MEDICATIONS  (STANDING):  ascorbic acid 500 milliGRAM(s) Oral daily  furosemide   Injectable 20 milliGRAM(s) IV Push two times a day  metoprolol succinate ER 50 milliGRAM(s) Oral daily  rivaroxaban 15 milliGRAM(s) Oral daily  traZODone 50 milliGRAM(s) Oral at bedtime    MEDICATIONS  (PRN):  benzonatate 100 milliGRAM(s) Oral three times a day PRN Cough  guaiFENesin   Syrup  (Sugar-Free) 100 milliGRAM(s) Oral every 6 hours PRN Cough      CAPILLARY BLOOD GLUCOSE        I&O's Summary    13 Oct 2019 07:01  -  14 Oct 2019 07:00  --------------------------------------------------------  IN: 1250 mL / OUT: 2750 mL / NET: -1500 mL    14 Oct 2019 07:01  -  14 Oct 2019 22:01  --------------------------------------------------------  IN: 1080 mL / OUT: 1300 mL / NET: -220 mL        PHYSICAL EXAM:    GENERAL: NAD  NECK: Supple, No JVD  CHEST/LUNG: Clear to auscultation bilaterally; Bibasilar crackles  HEART: Regular rate and rhythm; No murmurs, rubs, or gallops  ABDOMEN: Soft, Nontender, Nondistended; Bowel sounds present  EXTREMITIES:  2+ Peripheral Pulses, No edema  NEUROLOGY: AAOx 3      LABS:                        10.0   10.45 )-----------( 266      ( 14 Oct 2019 06:30 )             31.3     10-14    130<L>  |  91<L>  |  49<H>  ----------------------------<  118<H>  3.3<L>   |  25  |  2.10<H>    Ca    8.9      14 Oct 2019 06:30  Phos  3.7     10-14  Mg     1.8     10-14            Urinalysis Basic - ( 14 Oct 2019 08:30 )    Color: COLORLESS / Appearance: CLEAR / S.008 / pH: 6.5  Gluc: NEGATIVE / Ketone: NEGATIVE  / Bili: NEGATIVE / Urobili: NORMAL   Blood: NEGATIVE / Protein: NEGATIVE / Nitrite: NEGATIVE   Leuk Esterase: NEGATIVE / RBC: x / WBC x   Sq Epi: x / Non Sq Epi: x / Bacteria: x      CAPILLARY BLOOD GLUCOSE                    RADIOLOGY & ADDITIONAL TESTS:    Imaging Personally Reviewed:    Consultant(s) Notes Reviewed:      Care Discussed with Consultants/Other Providers:    Thanks for consult. Will follow.
SANGEETHA GARCIA  Patient is a 89y old  Male who presents with a chief complaint of   HPI:  89 yo M HTN, CHF, PPM, afib on xarelto c/o  cough w/sputum, SOB, LE swelling with CHF exacerbation. He was noted to have azotemia. He stated that [facility] that there was a kidney problem but he does not know the extent.  He has HTN but is not Diabetic. He never had a Kidney stone or Gout.    PAST MEDICAL & SURGICAL HISTORY:  Atrial fibrillation, unspecified type  Congestive heart failure, unspecified HF chronicity, unspecified heart failure type  Essential hypertension  Artificial cardiac pacemaker    MEDICATIONS  (STANDING):  ascorbic acid 500 milliGRAM(s) Oral daily  furosemide   Injectable 20 milliGRAM(s) IV Push two times a day  metoprolol succinate ER 50 milliGRAM(s) Oral daily  rivaroxaban 15 milliGRAM(s) Oral daily  traZODone 50 milliGRAM(s) Oral at bedtime    Allergies    No Known Allergies    Intolerances      FAMILY HISTORY:  No pertinent family history in first degree relatives      REVIEW OF SYSTEMS    General:  No fever, chills or night sweats.    Ophthalmologic: No changes in vision.  	  ENMT: No difficulty swallowing. 	    Respiratory and Thorax: He has dyspnea on exertion.  	  Cardiovascular: No chest pains, tiredness or palpitations.	    Gastrointestinal: No dyspepsia, constipation or diarrhea.	    Genitourinary:	 No dysuria, hematuria or frequency.    Musculoskeletal: No joint pains or swelling. No muscle pains. Edema.    Neurological:	No weakness or numbness. No seizures.    Hematology/Lymphatics: No heat or cold intolerance.    Endocrine: No polyuria or polydipsia.	      Vital Signs Last 24 Hrs  T(C): 36.8 (13 Oct 2019 21:51), Max: 36.8 (13 Oct 2019 21:51)  T(F): 98.3 (13 Oct 2019 21:51), Max: 98.3 (13 Oct 2019 21:51)  HR: 60 (13 Oct 2019 21:51) (60 - 69)  BP: 104/60 (13 Oct 2019 21:51) (96/54 - 124/69)  BP(mean): --  RR: 16 (13 Oct 2019 21:51) (16 - 17)  SpO2: 97% (13 Oct 2019 21:51) (97% - 100%)    PHYSICAL EXAMINATION:  Constitutional:  He appears comfortable and not distressed. Not diaphoretic.    Neck:  The thyroid is normal. Trachea is midline.     Respiratory: The lungs are clear to auscultation. No dullness and expansion is normal.    Cardiovascular: S1 and S2 are normal. No mummurs, rubs or gallops are present.    Gastrointestinal: The abdomen is soft. No tenderness is present. No masses are present. Bowel sounds are normal.    Genitourinary: The bladder is not distended. No CVA tenderness is present.    Extremities:  1+ edema is noted. No deformities are present.    Neurological: Cognition is normal. Tone, power and sensation are normal. Gait is steady.    Skin: No lesions are seen  or palpated.    Lymph Nodes: No lymphadenopathy is present.    Psychiatric: Mood is appropriate. No hallucinations or flight of ideas are noted.                            10.1   11.41 )-----------( 263      ( 13 Oct 2019 06:50 )             31.1     10-13    132<L>  |  89<L>  |  47<H>  ----------------------------<  129<H>  3.6   |  25  |  2.16<H>    Ca    9.3      13 Oct 2019 06:50  Phos  4.1     10-13  Mg     1.9     10-13    TPro  7.1  /  Alb  4.1  /  TBili  0.5  /  DBili  x   /  AST  36  /  ALT  44<H>  /  AlkPhos  52  10-12    LIVER FUNCTIONS - ( 12 Oct 2019 11:07 )  Alb: 4.1 g/dL / Pro: 7.1 g/dL / ALK PHOS: 52 u/L / ALT: 44 u/L / AST: 36 u/L / GGT: x           Urinalysis Basic - ( 13 Oct 2019 17:00 )    Color: LIGHT YELLOW / Appearance: CLEAR / S.012 / pH: 6.0  Gluc: NEGATIVE / Ketone: NEGATIVE  / Bili: NEGATIVE / Urobili: NORMAL   Blood: NEGATIVE / Protein: NEGATIVE / Nitrite: NEGATIVE   Leuk Esterase: NEGATIVE / RBC: x / WBC x   Sq Epi: x / Non Sq Epi: x / Bacteria: x

## 2019-10-14 NOTE — DISCHARGE NOTE NURSING/CASE MANAGEMENT/SOCIAL WORK - PATIENT PORTAL LINK FT
You can access the FollowMyHealth Patient Portal offered by [facility] by registering at the following website: [email or web address]. By joining [employer]’s FollowMyHealth portal, you will also be able to view your health information using other applications (apps) compatible with our system.

## 2019-10-14 NOTE — DIETITIAN INITIAL EVALUATION ADULT. - 25 CAL
9/20/2017 1:22 PM 
 
Patient:  Catrina Fowler YOB: 1931 Date of Visit: 9/18/2017 Dear Morris Landau, MD 
[facility] [address] [place] [address] [id number] VIA Facsimile: [phone] 
 : 
Mr. Victor Manuel Worrell is a 81 yo M with CAD s/p cath on 3/19/10 with multivessel CAD, then 5 vessel CABG on 3/23/10 (LIMA to LAD, SVG to D1, OM2, OM3, and SVG to RCA), EF 60% by echo 2010, hyperlipidemia (intolerance to lipitor) on crestor, asthma, DJD of lumbar spine, sciatica on right side followed by orthopedics, Dr. Kenia Flynn; s/p surgery on his right hip on 03/27/2017. Since his last visit, he continues to do well. He did have surgery on his right hip that went very smoothly. He is back to [facility]. He denies any chest pain, shortness of breath, palpitations. He has been compliant with his medications. Blood pressure is 134/78 with a heart rate of 64. He has lost some weight by watching what he takes in and his weight is down from 168 to 162 pounds. Assessment and Plan: 1. Coronary artery disease, status post bypass. Continue beta-blocker, statin and aspirin. He had blood work last time and will get this once a year. He is not on an ACE inhibitor and could consider this in the future if needed for blood pressure. 2. Essential hypertension. Blood pressure is controlled and no changes made. History of white coat hypertension. 3. Mixed hyperlipidemia. Had muscle aches with Lipitor and Crestor. Doing well on Zocor. 4. Dizziness. His workup with neurology and ENT have been unrevealing and this is much improved. If you have questions, please do not hesitate to call me. Sincerely, Leslie Diego MD 
 

2012

## 2019-10-14 NOTE — PROGRESS NOTE ADULT - ASSESSMENT
89 yo M HTN, CHF, PPM, afib on xarelto c/o  cough w/sputum, SOB, LE swelling with CHF exacerbation.    DAVID vs CKD 3B:  pt denied hx of kidney issues, no prior records. UA blend, hx of >20 year HTN  renal function has been relatively stable since admission.   US done without hydro  UA blend  likely CKD, possible sec to HTN  DAVID can not be r/o at this point, patient advise to follow up with dr. leiva in 1-2 weeks  Avoid Nephrotoxins.  lisinopril on hold   continue diuresing  - Follow up BMP.    Hyponatremia  likely sec to hypervolemia   continue diuresing  free water restriction    hypokalemia  supplemented  monitor 89 yo M HTN, CHF, PPM, afib on xarelto c/o  cough w/sputum, SOB, LE swelling with CHF exacerbation.    DAVID vs CKD 3B:  pt denied hx of kidney issues, no prior records. UA blend, hx of >20 year HTN  renal function has been relatively stable since admission.   US done without hydro  UA blend  likely CKD, possible sec to HTN  DAVID can not be r/o at this point, patient advise to follow up with dr. leiva in 1-2 weeks  Avoid Nephrotoxins.  lisinopril on hold   continue diuresing  - Follow up BMP.    Hyponatremia  likely sec to hypervolemia   continue diuresing  free water restriction    hypokalemia  supplemented  monitor

## 2019-10-14 NOTE — CONSULT NOTE ADULT - ASSESSMENT
This is a patient with CHF excerbation and Azotemia.  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  - Quantify urine Protein with random UPC.  - Renal SONO.  - Avoid Nephrotoxins.  - Continue Diuretics as he is still overloaded.  - Follow up BMP.
89 yo M HTN, CHF, PPM, afib on xarelto c/o  cough w/sputum, SOB, LE swelling with CHF exacerbation     Problem/Plan - 1:  ·  Problem: Acute Congestive heart failure Exa:   Plan: S/p IV lasix 40 BID    TTE reviewed. Cardio f/up noted     Problem/Plan - 2:  ·  Problem: Essential hypertension.  Plan: low salt, low fat, low cholesterol.      Problem/Plan - 3:  ·  Problem: Atrial fibrillation, unspecified type.  Plan:                                            On xarelto and metoprolol for rate control.      Problem/Plan - 4:  ·  Problem: Renal insufficiency.  Plan: Unsure of patients baseline  Nephro f/up noted.

## 2019-10-14 NOTE — DIETITIAN INITIAL EVALUATION ADULT. - PROBLEM SELECTOR PLAN 1
CHF exacerbation on IV lasix 40 BID  OZM=7004 KD=191  CXR: AGUSTIN  Trop=37--> 31  Ekg AV paced @ 65 bpm Q V1- V6.  Case dw Dr Peterson will continue Iv lasix 20 bid for now and consider decrease in am  Check TTE

## 2019-10-15 ENCOUNTER — APPOINTMENT (OUTPATIENT)
Dept: CARE COORDINATION | Facility: HOME HEALTH | Age: 84
End: 2019-10-15
Payer: MEDICARE

## 2019-10-15 VITALS
TEMPERATURE: 96.6 F | OXYGEN SATURATION: 99 % | HEART RATE: 60 BPM | RESPIRATION RATE: 16 BRPM | SYSTOLIC BLOOD PRESSURE: 120 MMHG | DIASTOLIC BLOOD PRESSURE: 60 MMHG

## 2019-10-15 DIAGNOSIS — I10 ESSENTIAL (PRIMARY) HYPERTENSION: ICD-10-CM

## 2019-10-15 DIAGNOSIS — B34.1 ENTEROVIRUS INFECTION, UNSPECIFIED: ICD-10-CM

## 2019-10-15 DIAGNOSIS — I48.91 UNSPECIFIED ATRIAL FIBRILLATION: ICD-10-CM

## 2019-10-15 DIAGNOSIS — I50.9 HEART FAILURE, UNSPECIFIED: ICD-10-CM

## 2019-10-15 DIAGNOSIS — B34.1 ENTEROVIRUS INFECTION, UNSPECIFIED: Chronic | ICD-10-CM

## 2019-10-15 PROBLEM — Z00.00 ENCOUNTER FOR PREVENTIVE HEALTH EXAMINATION: Status: ACTIVE | Noted: 2019-10-15

## 2019-10-15 PROCEDURE — 99349 HOME/RES VST EST MOD MDM 40: CPT

## 2019-10-15 RX ORDER — FUROSEMIDE 40 MG/1
40 TABLET ORAL
Qty: 30 | Refills: 0 | Status: ACTIVE | COMMUNITY
Start: 2019-10-14

## 2019-10-15 RX ORDER — CHLORTHALIDONE 25 MG/1
25 TABLET ORAL
Qty: 90 | Refills: 0 | Status: DISCONTINUED | COMMUNITY
Start: 2019-09-28

## 2019-10-15 RX ORDER — LISINOPRIL 40 MG/1
40 TABLET ORAL
Qty: 90 | Refills: 0 | Status: DISCONTINUED | COMMUNITY
Start: 2019-08-01

## 2019-10-15 RX ORDER — TRAZODONE HYDROCHLORIDE 50 MG/1
50 TABLET ORAL DAILY
Refills: 0 | Status: ACTIVE | COMMUNITY
Start: 2019-10-15

## 2019-10-15 RX ORDER — BENZONATATE 100 MG/1
100 CAPSULE ORAL
Qty: 15 | Refills: 0 | Status: ACTIVE | COMMUNITY
Start: 2019-10-14

## 2019-10-15 RX ORDER — METOPROLOL SUCCINATE 50 MG/1
50 TABLET, EXTENDED RELEASE ORAL
Qty: 90 | Refills: 0 | Status: ACTIVE | COMMUNITY
Start: 2019-08-01

## 2019-10-15 RX ORDER — RIVAROXABAN 15 MG/1
15 TABLET, FILM COATED ORAL DAILY
Refills: 0 | Status: ACTIVE | COMMUNITY
Start: 2019-10-15

## 2019-10-15 RX ORDER — GUAIFENESIN 200 MG/10ML
100 SOLUTION ORAL
Qty: 60 | Refills: 0 | Status: ACTIVE | COMMUNITY
Start: 2019-10-14

## 2019-10-15 NOTE — HISTORY OF PRESENT ILLNESS
[Family Member] : family member [FreeTextEntry1] : "Hospital Follow up for CHF and URI" Pt is homebound due to limited endurance and SOB. Seen in home for f/u TCM STAR CHF program. Pt is A&O x 3, lives alone. Seen in home with daughter present who is staying with patient post hospitalization and during recent loss of spouse.  Pt c/o URI congestion and stuffiness during visit.  Diagnosed with enterovirus on this admission at [facility] and treated with cough suppressants. Reports cough with clear/white sputum, no SOB, nasal discharge. Reports he feels feverish but temp reported '97F" with chills and general malaise, dizziness.  Dizziness does not occur with movements or change in position. On toprol 50 mg for HTN, denies visual changes, syncope, pre syncope.headache, chest pain.  Lasix was started for CHF exacerbation, reports urinating well. Appetite is good, baseline poor intake of fluids.  Pt orders food / does not like to cook. 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

## 2019-10-15 NOTE — COUNSELING
[Fall prevention counseling provided] : Fall prevention counseling provided [Adequate lighting] : Adequate lighting [Use proper foot wear] : Use proper foot wear [FreeTextEntry2] : diet high in sodium, processed foods/meals [Needs reinforcement, provided] : Patient needs reinforcement on understanding of lifestyle changes and steps needed to achieve self management goal; reinforcement was provided

## 2019-10-15 NOTE — PHYSICAL EXAM
[No Acute Distress] : no acute distress [PERRL] : pupils equal round and reactive to light [EOMI] : extraocular movements intact [Supple] : supple [No Respiratory Distress] : no respiratory distress  [No Accessory Muscle Use] : no accessory muscle use [Clear to Auscultation] : lungs were clear to auscultation bilaterally [Normal Rate] : normal rate  [Regular Rhythm] : with a regular rhythm [Normal S1, S2] : normal S1 and S2 [No Murmur] : no murmur heard [Soft] : abdomen soft [Non Tender] : non-tender [Non-distended] : non-distended [Normal Bowel Sounds] : normal bowel sounds [No Focal Deficits] : no focal deficits [Alert and Oriented x3] : oriented to person, place, and time [de-identified] : no tenderness, pain to frontal sinuses on palpation [de-identified] : trace pedal edema b/l

## 2019-10-30 ENCOUNTER — MOBILE ON CALL (OUTPATIENT)
Age: 84
End: 2019-10-30

## 2019-11-22 ENCOUNTER — OUTPATIENT (OUTPATIENT)
Dept: OUTPATIENT SERVICES | Facility: HOSPITAL | Age: 84
LOS: 1 days | Discharge: STILL ADMITTED OR ADMINISTRATIVE | End: 2019-11-22
Payer: MEDICARE

## 2019-11-22 DIAGNOSIS — Z95.0 PRESENCE OF CARDIAC PACEMAKER: Chronic | ICD-10-CM

## 2019-11-22 PROCEDURE — 90840 PSYTX CRISIS EA ADDL 30 MIN: CPT

## 2019-11-22 PROCEDURE — 90792 PSYCH DIAG EVAL W/MED SRVCS: CPT

## 2019-12-02 ENCOUNTER — INPATIENT (INPATIENT)
Facility: HOSPITAL | Age: 84
LOS: 0 days | Discharge: HOME | End: 2019-12-03
Attending: INTERNAL MEDICINE | Admitting: INTERNAL MEDICINE
Payer: MEDICARE

## 2019-12-02 VITALS
HEART RATE: 65 BPM | SYSTOLIC BLOOD PRESSURE: 107 MMHG | RESPIRATION RATE: 18 BRPM | TEMPERATURE: 98 F | OXYGEN SATURATION: 100 % | DIASTOLIC BLOOD PRESSURE: 83 MMHG

## 2019-12-02 DIAGNOSIS — I50.9 HEART FAILURE, UNSPECIFIED: ICD-10-CM

## 2019-12-02 DIAGNOSIS — E87.6 HYPOKALEMIA: ICD-10-CM

## 2019-12-02 DIAGNOSIS — N18.9 CHRONIC KIDNEY DISEASE, UNSPECIFIED: ICD-10-CM

## 2019-12-02 DIAGNOSIS — I10 ESSENTIAL (PRIMARY) HYPERTENSION: ICD-10-CM

## 2019-12-02 DIAGNOSIS — I48.91 UNSPECIFIED ATRIAL FIBRILLATION: ICD-10-CM

## 2019-12-02 DIAGNOSIS — Z02.9 ENCOUNTER FOR ADMINISTRATIVE EXAMINATIONS, UNSPECIFIED: ICD-10-CM

## 2019-12-02 DIAGNOSIS — Z95.0 PRESENCE OF CARDIAC PACEMAKER: ICD-10-CM

## 2019-12-02 DIAGNOSIS — Z29.9 ENCOUNTER FOR PROPHYLACTIC MEASURES, UNSPECIFIED: ICD-10-CM

## 2019-12-02 DIAGNOSIS — Z95.0 PRESENCE OF CARDIAC PACEMAKER: Chronic | ICD-10-CM

## 2019-12-02 LAB
ALBUMIN SERPL ELPH-MCNC: 4.3 G/DL — SIGNIFICANT CHANGE UP (ref 3.3–5)
ALP SERPL-CCNC: 57 U/L — SIGNIFICANT CHANGE UP (ref 40–120)
ALT FLD-CCNC: 31 U/L — SIGNIFICANT CHANGE UP (ref 4–41)
ANION GAP SERPL CALC-SCNC: 16 MMO/L — HIGH (ref 7–14)
AST SERPL-CCNC: 45 U/L — HIGH (ref 4–40)
BASOPHILS # BLD AUTO: 0.06 K/UL — SIGNIFICANT CHANGE UP (ref 0–0.2)
BASOPHILS NFR BLD AUTO: 0.5 % — SIGNIFICANT CHANGE UP (ref 0–2)
BILIRUB SERPL-MCNC: 0.7 MG/DL — SIGNIFICANT CHANGE UP (ref 0.2–1.2)
BUN SERPL-MCNC: 62 MG/DL — HIGH (ref 7–23)
CALCIUM SERPL-MCNC: 9.5 MG/DL — SIGNIFICANT CHANGE UP (ref 8.4–10.5)
CHLORIDE SERPL-SCNC: 87 MMOL/L — LOW (ref 98–107)
CO2 SERPL-SCNC: 32 MMOL/L — HIGH (ref 22–31)
CREAT SERPL-MCNC: 2.38 MG/DL — HIGH (ref 0.5–1.3)
EOSINOPHIL # BLD AUTO: 0.09 K/UL — SIGNIFICANT CHANGE UP (ref 0–0.5)
EOSINOPHIL NFR BLD AUTO: 0.7 % — SIGNIFICANT CHANGE UP (ref 0–6)
GLUCOSE SERPL-MCNC: 148 MG/DL — HIGH (ref 70–99)
HCT VFR BLD CALC: 29.4 % — LOW (ref 39–50)
HGB BLD-MCNC: 9.3 G/DL — LOW (ref 13–17)
IMM GRANULOCYTES NFR BLD AUTO: 0.7 % — SIGNIFICANT CHANGE UP (ref 0–1.5)
INR BLD: 1.82 — HIGH (ref 0.88–1.17)
LYMPHOCYTES # BLD AUTO: 1.7 K/UL — SIGNIFICANT CHANGE UP (ref 1–3.3)
LYMPHOCYTES # BLD AUTO: 13.9 % — SIGNIFICANT CHANGE UP (ref 13–44)
MCHC RBC-ENTMCNC: 24.8 PG — LOW (ref 27–34)
MCHC RBC-ENTMCNC: 31.6 % — LOW (ref 32–36)
MCV RBC AUTO: 78.4 FL — LOW (ref 80–100)
MONOCYTES # BLD AUTO: 1.23 K/UL — HIGH (ref 0–0.9)
MONOCYTES NFR BLD AUTO: 10.1 % — SIGNIFICANT CHANGE UP (ref 2–14)
NEUTROPHILS # BLD AUTO: 9.04 K/UL — HIGH (ref 1.8–7.4)
NEUTROPHILS NFR BLD AUTO: 74.1 % — SIGNIFICANT CHANGE UP (ref 43–77)
NRBC # FLD: 0 K/UL — SIGNIFICANT CHANGE UP (ref 0–0)
NT-PROBNP SERPL-SCNC: 1848 PG/ML — SIGNIFICANT CHANGE UP
PLATELET # BLD AUTO: 330 K/UL — SIGNIFICANT CHANGE UP (ref 150–400)
PMV BLD: 10.1 FL — SIGNIFICANT CHANGE UP (ref 7–13)
POTASSIUM SERPL-MCNC: 2.8 MMOL/L — CRITICAL LOW (ref 3.5–5.3)
POTASSIUM SERPL-SCNC: 2.8 MMOL/L — CRITICAL LOW (ref 3.5–5.3)
PROT SERPL-MCNC: 7.8 G/DL — SIGNIFICANT CHANGE UP (ref 6–8.3)
PROTHROM AB SERPL-ACNC: 20.6 SEC — HIGH (ref 9.8–13.1)
RBC # BLD: 3.75 M/UL — LOW (ref 4.2–5.8)
RBC # FLD: 15.6 % — HIGH (ref 10.3–14.5)
SODIUM SERPL-SCNC: 135 MMOL/L — SIGNIFICANT CHANGE UP (ref 135–145)
TROPONIN T, HIGH SENSITIVITY: 44 NG/L — SIGNIFICANT CHANGE UP (ref ?–14)
WBC # BLD: 12.2 K/UL — HIGH (ref 3.8–10.5)
WBC # FLD AUTO: 12.2 K/UL — HIGH (ref 3.8–10.5)

## 2019-12-02 PROCEDURE — 71046 X-RAY EXAM CHEST 2 VIEWS: CPT | Mod: 26

## 2019-12-02 RX ORDER — POTASSIUM CHLORIDE 20 MEQ
10 PACKET (EA) ORAL
Refills: 0 | Status: COMPLETED | OUTPATIENT
Start: 2019-12-02 | End: 2019-12-02

## 2019-12-02 RX ORDER — FUROSEMIDE 40 MG
40 TABLET ORAL
Refills: 0 | Status: DISCONTINUED | OUTPATIENT
Start: 2019-12-02 | End: 2019-12-03

## 2019-12-02 RX ORDER — TRAZODONE HCL 50 MG
100 TABLET ORAL AT BEDTIME
Refills: 0 | Status: DISCONTINUED | OUTPATIENT
Start: 2019-12-02 | End: 2019-12-03

## 2019-12-02 RX ORDER — ASCORBIC ACID 60 MG
1 TABLET,CHEWABLE ORAL
Qty: 0 | Refills: 0 | DISCHARGE

## 2019-12-02 RX ORDER — LANOLIN ALCOHOL/MO/W.PET/CERES
6 CREAM (GRAM) TOPICAL ONCE
Refills: 0 | Status: COMPLETED | OUTPATIENT
Start: 2019-12-02 | End: 2019-12-02

## 2019-12-02 RX ORDER — FONDAPARINUX SODIUM 2.5 MG/.5ML
1 INJECTION, SOLUTION SUBCUTANEOUS
Qty: 0 | Refills: 0 | DISCHARGE

## 2019-12-02 RX ORDER — METOPROLOL TARTRATE 50 MG
50 TABLET ORAL DAILY
Refills: 0 | Status: DISCONTINUED | OUTPATIENT
Start: 2019-12-02 | End: 2019-12-03

## 2019-12-02 RX ORDER — FUROSEMIDE 40 MG
80 TABLET ORAL ONCE
Refills: 0 | Status: COMPLETED | OUTPATIENT
Start: 2019-12-02 | End: 2019-12-02

## 2019-12-02 RX ORDER — TRAZODONE HCL 50 MG
1 TABLET ORAL
Qty: 0 | Refills: 0 | DISCHARGE

## 2019-12-02 RX ORDER — RIVAROXABAN 15 MG-20MG
1 KIT ORAL
Qty: 0 | Refills: 0 | DISCHARGE

## 2019-12-02 RX ORDER — POTASSIUM CHLORIDE 20 MEQ
40 PACKET (EA) ORAL ONCE
Refills: 0 | Status: COMPLETED | OUTPATIENT
Start: 2019-12-02 | End: 2019-12-02

## 2019-12-02 RX ORDER — METOPROLOL TARTRATE 50 MG
1 TABLET ORAL
Qty: 0 | Refills: 0 | DISCHARGE

## 2019-12-02 RX ORDER — RIVAROXABAN 15 MG-20MG
15 KIT ORAL
Refills: 0 | Status: DISCONTINUED | OUTPATIENT
Start: 2019-12-02 | End: 2019-12-03

## 2019-12-02 RX ADMIN — Medication 100 MILLIEQUIVALENT(S): at 19:36

## 2019-12-02 RX ADMIN — Medication 80 MILLIGRAM(S): at 18:04

## 2019-12-02 RX ADMIN — Medication 100 MILLIGRAM(S): at 22:09

## 2019-12-02 RX ADMIN — Medication 100 MILLIEQUIVALENT(S): at 19:11

## 2019-12-02 RX ADMIN — Medication 40 MILLIEQUIVALENT(S): at 18:03

## 2019-12-02 RX ADMIN — RIVAROXABAN 15 MILLIGRAM(S): KIT at 22:26

## 2019-12-02 RX ADMIN — Medication 100 MILLIEQUIVALENT(S): at 18:06

## 2019-12-02 NOTE — CONSULT NOTE ADULT - ASSESSMENT
89 year old male PMH AF on Xarelto, PPM, HTN, CHF, p/w worsening b/l pedal edema.     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   UA blend  likely CKD, possible sec to HTN  Avoid Nephrotoxins.  continue diuresing  - Follow up BMP.    hypokalemia  sec to diuresing  supplemented  monitor    HTN  suboptimal  likely sec to hypervolemia  continue diuresing  monitor    LE edema  continue diuresing  recent echo with normal EF, mod AS  f/u cardio  monitor I/O daily weight    Alkalosis  consider check AGB  monitor    anemia  check iron panel  monitor Hb 89 year old male PMH AF on Xarelto, PPM, HTN, CHF, p/w worsening b/l pedal edema.     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   UA blend  likely CKD, possible sec to HTN  Avoid Nephrotoxins.  continue diuresing  - Follow up BMP.    hypokalemia  sec to diuresing  supplemented  monitor    HTN  suboptimal  likely sec to hypervolemia  continue diuresing  monitor    LE edema  continue diuresing  Lasix 80mg IV one dose then daily  recent echo with normal EF, mod AS  f/u cardio  monitor I/O daily weight    Alkalosis  consider check ABG  monitor    anemia  check iron panel  monitor Hb

## 2019-12-02 NOTE — H&P ADULT - PROBLEM SELECTOR PLAN 4
- CHAD2-VASC: 4  - EKG: V-Paced@ 68 TWI AvL , V1 Qtc 582   - continue AC w/ Xarelto  - f/u coags daily   - continue rate control w/ Metoprolol   - no caffeine

## 2019-12-02 NOTE — H&P ADULT - NSHPLABSRESULTS_GEN_ALL_CORE
LABORATORY VALUES	 	                          9.3    12.20 )-----------( 330      ( 02 Dec 2019 14:30 )             29.4       12-02    135  |  87<L>  |  62<H>  ----------------------------<  148<H>  2.8<LL>   |  32<H>  |  2.38<H>    Ca    9.5      02 Dec 2019 14:35    TPro  7.8  /  Alb  4.3  /  TBili  0.7  /  DBili  x   /  AST  45<H>  /  ALT  31  /  AlkPhos  57  12-02    LIVER FUNCTIONS - ( 02 Dec 2019 14:35 )  Alb: 4.3 g/dL / Pro: 7.8 g/dL / ALK PHOS: 57 u/L / ALT: 31 u/L / AST: 45 u/L / GGT: x           INR: 1.82 (12-02 @ 14:35)    CARDIAC MARKERS:  Troponin T, High Sensitivity: 44 ng/L (12-02-19 @ 14:35)    Serum Pro-Brain Natriuretic Peptide: 1848 pg/mL (12-02 @ 14:35)  At Previous Admission ProBNP was 1300      10-13 @ 06:50  Cholesterol, Serum - 86  Direct LDL- 33  HDL Cholesterol, Serum- 49  Triglycerides, Serum- 77    Hemoglobin A1C, Whole Blood: 5.9 % (10-13 @ 06:50)    Thyroid Stimulating Hormone, Serum: 1.95 uIU/mL (10-13 @ 06:50)    ECHO 10/13 EF 64% CONCLUSIONS:  1. Mitral annular calcification, otherwise normal mitral  valve. Mild mitral regurgitation.  2. Aortic valve leaflet morphology not well visualized.  The valve is heavily calcified. Peak transaortic valve  gradient equals 60 mm Hg, mean transaortic valve gradient  equals 36 mm Hg, consistent with at least moderate aortic  stenosis.  However, unable to accurately estimate the  aortic valve area. Mild aortic regurgitation.  3. Normal left ventricular internal dimensions and wall  thicknesses.  4. Normal left ventricular systolic function. No segmental  wall motion abnormalities.  5. Normal right ventricular size and function.  Consider KAREN for further evaluation of the aortic valve, if  clinically indicated.    EKG: V-Paced@ 68 TWI AvL , V1 Qtc 582     Chest Xray: IMPRESSION:  Small right pleural effusion with adjacent nonspecific right basilar   consolidative changes. No left pleural effusion. Clear remaining   visualized lungs. No pneumothorax.    Stable left chestwall dual-lead pacemaker and cardiac and mediastinal   silhouettes.

## 2019-12-02 NOTE — ED PROVIDER NOTE - ATTENDING CONTRIBUTION TO CARE
Patient presents with worsening LE edema will need IV diuresis, otherwise well appearing and breathing comfortably.

## 2019-12-02 NOTE — H&P ADULT - PROBLEM SELECTOR PLAN 8
1.  Name of PCP:    2.  PCP Contacted on Admission: [ ] Y    [x] N - admitted at night    3.  PCP contacted at Discharge: [ ] Y    [ ] N    [ ] N/A  4.  Post-Discharge Appointment Date and Location:  5.  Summary of Handoff given to PCP:

## 2019-12-02 NOTE — ED ADULT NURSE NOTE - NSIMPLEMENTINTERV_GEN_ALL_ED
Implemented All Fall with Harm Risk Interventions:  [place] to call system. Call bell, personal items and telephone within reach. Instruct patient to call for assistance. Room bathroom lighting operational. Non-slip footwear when patient is off stretcher. Physically safe environment: no spills, clutter or unnecessary equipment. Stretcher in lowest position, wheels locked, appropriate side rails in place. Provide visual cue, wrist band, yellow gown, etc. Monitor gait and stability. Monitor for mental status changes and reorient to person, place, and time. Review medications for side effects contributing to fall risk. Reinforce activity limits and safety measures with patient and family. Provide visual clues: red socks.

## 2019-12-02 NOTE — H&P ADULT - PROBLEM SELECTOR PLAN 1
- BNP:  1848 , In October was 1300  - Continue diuresis w/ 40mg IVP Lasix BID  - S/p 80mg IVP Lasix in ED  - monitor electrolytes   - daily weights   - strict I&Os  - fluid restriction   - low sodium intake   - TTE AM  - CXR - Small right pleural effusion with adjacent nonspecific right basilar   consolidative changes.  - Cardio c/s AM

## 2019-12-02 NOTE — H&P ADULT - HISTORY OF PRESENT ILLNESS
89 y/o Male with PMHx of A-Fib (On Xarelto) , PPM, HTN, CHF presents with worsening B/L LE EDEMA. Pt states that past 4-6 weeks he noticed that he developed worsening swelling. Pt admits that he takes Lasix 40mg Qd at home. Pt denies chest pain, SOB, ALFONSO, PND, orthopnea ( sleeps on 3-4 pillow), palpitations, diaphoresis, lightheadedness, dizziness, syncope, fever chills, malaise, myalgias, anorexia, weight changes ( loss or gain), nightsweats, generalized fatigue abdominal pain, N/V/C/D BRBPR, melena, urinary symptoms, cough, and wheezing.

## 2019-12-02 NOTE — CONSULT NOTE ADULT - SUBJECTIVE AND OBJECTIVE BOX
[facility]   MD BETH DXION DO MARYANNE SOURIAL, DO ANGELA WONG, PA    TEL:  OFFICE: [phone]  DR LEIVA CELL: [phone]  DR. CUELLAR CELL: [phone]  DR. RYAN CELL: [phone]  EMMANUEL CASTRO CELL: [phone]    HPI:  89 year old male PMH AF on Xarelto, PPM, HTN, CHF, p/w worsening b/l pedal edema. Patient recently dx w/ CHF, now on Lasix 40mg. Went to see Dr. Alonso for cardiology, recent TTE in October showing EF >60%. Denies fever, cough, chest pain, ALFONSO  patient follow up with dr. leiva for ckd management    Allergies:  No Known Allergies      PAST MEDICAL & SURGICAL HISTORY:  Atrial fibrillation, unspecified type  Congestive heart failure, unspecified HF chronicity, unspecified heart failure type  Essential hypertension  Artificial cardiac pacemaker      Home Medications Reviewed    Hospital Medications:   MEDICATIONS  (STANDING):  furosemide   Injectable 80 milliGRAM(s) IV Push Once  potassium chloride    Tablet ER 40 milliEquivalent(s) Oral once  potassium chloride  10 mEq/100 mL IVPB 10 milliEquivalent(s) IV Intermittent every 1 hour      SOCIAL HISTORY:  Denies ETOh, Smoking,     FAMILY HISTORY:  No pertinent family history in first degree relatives      REVIEW OF SYSTEMS:  CONSTITUTIONAL: No weakness, fevers or chills  EYES/ENT: No visual changes;  No vertigo or throat pain   NECK: No pain or stiffness  RESPIRATORY: No cough, wheezing, hemoptysis; No shortness of breath  CARDIOVASCULAR: No chest pain or palpitations.  GASTROINTESTINAL: No abdominal or epigastric pain. No nausea, vomiting, or hematemesis; No diarrhea or constipation. No melena or hematochezia.  GENITOURINARY: No dysuria, frequency, foamy urine, urinary urgency, incontinence or hematuria  NEUROLOGICAL: No numbness or weakness  SKIN: No itching, burning, rashes, or lesions   VASCULAR: + bilateral lower extremity edema.   All other review of systems is negative unless indicated above.    VITALS:  T(F): 97.5 (12-02-19 @ 13:25), Max: 97.5 (12-02-19 @ 13:25)  HR: 73 (12-02-19 @ 14:39)  BP: 157/52 (12-02-19 @ 14:39)  RR: 18 (12-02-19 @ 14:39)  SpO2: 98% (12-02-19 @ 14:39)  Wt(kg): --        PHYSICAL EXAM:  Constitutional: NAD  HEENT: anicteric sclera, oropharynx clear, MMM  Neck: No JVD  Respiratory: CTAB, no wheezes, rales or rhonchi  Cardiovascular: S1, S2, RRR  Gastrointestinal: BS+, soft, NT/ND  Extremities: 3+ peripheral edema b/l LE  Neurological: A/O x 3, no focal deficits  Psychiatric: Normal mood, normal affect  : No CVA tenderness. No reich.   Skin: No rashes    LABS:  12-02    135  |  87<L>  |  62<H>  ----------------------------<  148<H>  2.8<LL>   |  32<H>  |  2.38<H>    Ca    9.5      02 Dec 2019 14:35    TPro  7.8  /  Alb  4.3  /  TBili  0.7  /  DBili      /  AST  45<H>  /  ALT  31  /  AlkPhos  57  12-02    Creatinine Trend: 2.38 <--                        9.3    12.20 )-----------( 330      ( 02 Dec 2019 14:30 )             29.4     Urine Studies:        RADIOLOGY & ADDITIONAL STUDIES: [facility] [place]   MD BETH DIXON DO MARYANNE SOURIAL, DO ANGELA WONG, PA    TEL:  OFFICE: [phone]  DR LEIVA CELL: [phone]  DR. CUELLAR CELL: [phone]  DR. RYAN CELL: [phone]  EMMANUEL CASTRO CELL: [phone]    HPI:  89 year old male PMH AF on Xarelto, PPM, HTN, CHF, p/w worsening b/l pedal edema. Patient recently dx w/ CHF, now on Lasix 40mg. Went to see Dr. Peterson for cardiology, recent TTE in October showing EF >60%. Denies fever, cough, chest pain, ALFONSO  patient follow up with dr. leiva for ckd management    Allergies:  No Known Allergies      PAST MEDICAL & SURGICAL HISTORY:  Atrial fibrillation, unspecified type  Congestive heart failure, unspecified HF chronicity, unspecified heart failure type  Essential hypertension  Artificial cardiac pacemaker      Home Medications Reviewed    Hospital Medications:   MEDICATIONS  (STANDING):  furosemide   Injectable 80 milliGRAM(s) IV Push Once  potassium chloride    Tablet ER 40 milliEquivalent(s) Oral once  potassium chloride  10 mEq/100 mL IVPB 10 milliEquivalent(s) IV Intermittent every 1 hour      SOCIAL HISTORY:  Denies ETOh, Smoking,     FAMILY HISTORY:  No pertinent family history in first degree relatives      REVIEW OF SYSTEMS:  CONSTITUTIONAL: No weakness, fevers or chills  EYES/ENT: No visual changes;  No vertigo or throat pain   NECK: No pain or stiffness  RESPIRATORY: No cough, wheezing, hemoptysis; No shortness of breath  CARDIOVASCULAR: No chest pain or palpitations.  GASTROINTESTINAL: No abdominal or epigastric pain. No nausea, vomiting, or hematemesis; No diarrhea or constipation. No melena or hematochezia.  GENITOURINARY: No dysuria, frequency, foamy urine, urinary urgency, incontinence or hematuria  NEUROLOGICAL: No numbness or weakness  SKIN: No itching, burning, rashes, or lesions   VASCULAR: + bilateral lower extremity edema.   All other review of systems is negative unless indicated above.    VITALS:  T(F): 97.5 (12-02-19 @ 13:25), Max: 97.5 (12-02-19 @ 13:25)  HR: 73 (12-02-19 @ 14:39)  BP: 157/52 (12-02-19 @ 14:39)  RR: 18 (12-02-19 @ 14:39)  SpO2: 98% (12-02-19 @ 14:39)  Wt(kg): --        PHYSICAL EXAM:  Constitutional: NAD  HEENT: anicteric sclera, oropharynx clear, MMM  Neck: No JVD  Respiratory: CTAB, no wheezes, rales or rhonchi  Cardiovascular: S1, S2, RRR  Gastrointestinal: BS+, soft, NT/ND  Extremities: 3+ peripheral edema b/l LE  Neurological: A/O x 3, no focal deficits  Psychiatric: Normal mood, normal affect  : No CVA tenderness. No reich.   Skin: No rashes    LABS:  12-02    135  |  87<L>  |  62<H>  ----------------------------<  148<H>  2.8<LL>   |  32<H>  |  2.38<H>    Ca    9.5      02 Dec 2019 14:35    TPro  7.8  /  Alb  4.3  /  TBili  0.7  /  DBili      /  AST  45<H>  /  ALT  31  /  AlkPhos  57  12-02    Creatinine Trend: 2.38 <--                        9.3    12.20 )-----------( 330      ( 02 Dec 2019 14:30 )             29.4     Urine Studies:        RADIOLOGY & ADDITIONAL STUDIES:

## 2019-12-02 NOTE — ED ADULT TRIAGE NOTE - CHIEF COMPLAINT QUOTE
Patient reporting worsening BL lower extremity edema and generalized weakness.  Patient denies shortness of breath and chest pain.  Patient also reporting worsening insomnia and panic attacks.

## 2019-12-02 NOTE — ED PROVIDER NOTE - OBJECTIVE STATEMENT
89 year old male PMH AF on Xarelto, PPM, HTN, CHF, p/w worsening b/l pedal edema. Patient p/w daughter, stating edema has progressed over past 4-6 weeks, began weeping today. Patient recently dx w/ CHF, now on Lasix 40mg. Went to see Dr. Alonso for cardiology, recent TTE in October showing EF >60%. Denies fever, cough, chest pain, ALFONSO, orthopnea however uses 3 pillows at night, abd pain, nausea, vomiting, weakness, or numbness/tingling. Daughter also notes patient w/ poor sleep habit 2/2 recent death of wife 6 weeks ago. States patient is afraid to fall asleep, leading to increased anxiety.

## 2019-12-02 NOTE — ED ADULT NURSE NOTE - OBJECTIVE STATEMENT
REMEDIOS RN:   Pt presented to ED c/o of BLE swelling, worsens over the last few months. Pt states he has been seen as outpatient with nephrologist. Pt states " heart valve is not working". Pt denies chest pain, SOB, dizziness or blurry vision. Pt denies fever and chills. Noted by BLE, weeping and appears moist, socks are wet to touch. Daughter remains by bedside, states concern about pt's sleep pattern due to loss of wife 2 months ago, and feels pt is anxious. Pt appears well otherwise, breathing is even and unlabored, skin warm and dry. IV insertion Left AC 20G. Labs drawn and sent. Pt placed on tele monitor. Endorsed to primary RN.

## 2019-12-02 NOTE — ED PROVIDER NOTE - CLINICAL SUMMARY MEDICAL DECISION MAKING FREE TEXT BOX
French Turner, PGY1: 89 year old male recently dx w/ CHF on Lasix p/w worsening pedal edema, now weeping. Denies fever, CP, SOB, ALFONSO. Last TTE in October w/ EF >60%. Follows w/ Cardiology Dr. Alonso. Rales in b/l lower lobes on exam, 2+ pitting w/ weeping of LE. Plan for EKG, labs w/ BNP, CXR, reassess. Possible admission vs d/c w/ close follow up. French Turner, PGY1: 89 year old male recently dx w/ CHF on Lasix p/w worsening pedal edema, now weeping. Denies fever, CP, SOB, ALFONSO. Last TTE in October w/ EF >60%. Follows w/ Cardiology Dr. Alonso. Rales in b/l lower lobes on exam, 2+ pitting w/ weeping of LE. Plan for EKG, labs w/ BNP, CXR, reassess. Possible admission for diuresis vs d/c w/ close follow up.

## 2019-12-02 NOTE — ED PROVIDER NOTE - PHYSICAL EXAMINATION
GENERAL: A&Ox3, non-toxic appearing, no acute distress  HEENT: NCAT, EOMI, oral mucosa moist, normal conjunctiva  RESP: rales b/l lower lobes, 4-5 word dyspnea but otherwise breathing unlabored  CV: irregularly irregular, systolic murmur, b/l LE edema w/ 2+ pitting to suprapatellar region and weeping  ABDOMEN: soft, non-tender, non-distended, no guarding  MSK: no visible deformities  NEURO: no focal sensory or motor deficits, PRINGLE  SKIN: warm, normal color, well perfused, no rash  PSYCH: normal affect    -French Turner, PGY-1

## 2019-12-02 NOTE — ED CLERICAL - NS ED CLERK NOTE PRE-ARRIVAL INFORMATION; ADDITIONAL PRE-ARRIVAL INFORMATION
This patient is enrolled in the Heart Failure STAR readmission reduction initiative and has active care navigation. This patient can be followed up by the care navigation team within 24 hours.  To arrange close follow-up or to obtain additional clinical information, please call the contact number above.  For house cards patients, please call cardiology ([phone]) for ALL PATIENTS with a cardiac issue PRIOR to disposition if you are considering admission or observation.  Consider CDU for management of CHF exacerbations per guidelines

## 2019-12-02 NOTE — ED PROVIDER NOTE - PROGRESS NOTE DETAILS
French Turner, PGY1: Lab advised critical K of 2.8 - will replete w/ PO 2/2 CHF exacerbation do not want excess fluid of IV K+ French Turner, PGY1: D/w Dr. Stapleton, all questions answered, patient admitted to medicine on tele.

## 2019-12-02 NOTE — H&P ADULT - PROBLEM SELECTOR PLAN 2
- K 2.8  - s/p 40meq Potassium Chloride x1 PO  - s/p 10meq IVP Potassium Chloride 3 runs of IV  - Monitor

## 2019-12-02 NOTE — H&P ADULT - ASSESSMENT
89 y/o Male with PMHx of A-Fib (On Xarelto) , PPM, HTN, CHF presents with worsening B/L LE EDEMA. . Pt is being admitted for acute on chronic CHF  and hypokalemia

## 2019-12-03 ENCOUNTER — TRANSCRIPTION ENCOUNTER (OUTPATIENT)
Age: 84
End: 2019-12-03

## 2019-12-03 VITALS
HEART RATE: 67 BPM | TEMPERATURE: 98 F | OXYGEN SATURATION: 98 % | RESPIRATION RATE: 19 BRPM | SYSTOLIC BLOOD PRESSURE: 145 MMHG | DIASTOLIC BLOOD PRESSURE: 52 MMHG

## 2019-12-03 LAB
ANION GAP SERPL CALC-SCNC: 17 MMO/L — HIGH (ref 7–14)
APTT BLD: 37.6 SEC — HIGH (ref 27.5–36.3)
BUN SERPL-MCNC: 61 MG/DL — HIGH (ref 7–23)
CALCIUM SERPL-MCNC: 9.2 MG/DL — SIGNIFICANT CHANGE UP (ref 8.4–10.5)
CHLORIDE SERPL-SCNC: 85 MMOL/L — LOW (ref 98–107)
CHOLEST SERPL-MCNC: 81 MG/DL — LOW (ref 120–199)
CK MB BLD-MCNC: 14.46 NG/ML — HIGH (ref 1–6.6)
CK SERPL-CCNC: 821 U/L — HIGH (ref 30–200)
CO2 SERPL-SCNC: 29 MMOL/L — SIGNIFICANT CHANGE UP (ref 22–31)
CREAT SERPL-MCNC: 2.43 MG/DL — HIGH (ref 0.5–1.3)
GLUCOSE SERPL-MCNC: 122 MG/DL — HIGH (ref 70–99)
HCT VFR BLD CALC: 27.5 % — LOW (ref 39–50)
HDLC SERPL-MCNC: 49 MG/DL — SIGNIFICANT CHANGE UP (ref 35–55)
HGB BLD-MCNC: 8.7 G/DL — LOW (ref 13–17)
INR BLD: 3.27 — HIGH (ref 0.88–1.17)
LIPID PNL WITH DIRECT LDL SERPL: 29 MG/DL — SIGNIFICANT CHANGE UP
MAGNESIUM SERPL-MCNC: 2.1 MG/DL — SIGNIFICANT CHANGE UP (ref 1.6–2.6)
MCHC RBC-ENTMCNC: 24.6 PG — LOW (ref 27–34)
MCHC RBC-ENTMCNC: 31.6 % — LOW (ref 32–36)
MCV RBC AUTO: 77.9 FL — LOW (ref 80–100)
NRBC # FLD: 0 K/UL — SIGNIFICANT CHANGE UP (ref 0–0)
PHOSPHATE SERPL-MCNC: 3.8 MG/DL — SIGNIFICANT CHANGE UP (ref 2.5–4.5)
PLATELET # BLD AUTO: 316 K/UL — SIGNIFICANT CHANGE UP (ref 150–400)
PMV BLD: 10.6 FL — SIGNIFICANT CHANGE UP (ref 7–13)
POTASSIUM SERPL-MCNC: 2.6 MMOL/L — CRITICAL LOW (ref 3.5–5.3)
POTASSIUM SERPL-SCNC: 2.6 MMOL/L — CRITICAL LOW (ref 3.5–5.3)
PROTHROM AB SERPL-ACNC: 37.7 SEC — HIGH (ref 9.8–13.1)
RBC # BLD: 3.53 M/UL — LOW (ref 4.2–5.8)
RBC # FLD: 15.5 % — HIGH (ref 10.3–14.5)
SODIUM SERPL-SCNC: 131 MMOL/L — LOW (ref 135–145)
TRIGL SERPL-MCNC: 66 MG/DL — SIGNIFICANT CHANGE UP (ref 10–149)
TROPONIN T, HIGH SENSITIVITY: 46 NG/L — SIGNIFICANT CHANGE UP (ref ?–14)
TSH SERPL-MCNC: 2.53 UIU/ML — SIGNIFICANT CHANGE UP (ref 0.27–4.2)
WBC # BLD: 12.6 K/UL — HIGH (ref 3.8–10.5)
WBC # FLD AUTO: 12.6 K/UL — HIGH (ref 3.8–10.5)

## 2019-12-03 RX ORDER — MIRTAZAPINE 45 MG/1
1 TABLET, ORALLY DISINTEGRATING ORAL
Qty: 0 | Refills: 0 | DISCHARGE

## 2019-12-03 RX ORDER — POTASSIUM CHLORIDE 20 MEQ
10 PACKET (EA) ORAL
Refills: 0 | Status: COMPLETED | OUTPATIENT
Start: 2019-12-03 | End: 2019-12-03

## 2019-12-03 RX ORDER — POTASSIUM CHLORIDE 20 MEQ
2 PACKET (EA) ORAL
Qty: 0 | Refills: 0 | DISCHARGE

## 2019-12-03 RX ORDER — FUROSEMIDE 40 MG
1 TABLET ORAL
Qty: 0 | Refills: 0 | DISCHARGE

## 2019-12-03 RX ORDER — POTASSIUM CHLORIDE 20 MEQ
2 PACKET (EA) ORAL
Qty: 8 | Refills: 0
Start: 2019-12-03 | End: 2019-12-04

## 2019-12-03 RX ORDER — POTASSIUM CHLORIDE 20 MEQ
1 PACKET (EA) ORAL
Qty: 30 | Refills: 0
Start: 2019-12-03 | End: 2020-01-01

## 2019-12-03 RX ADMIN — Medication 40 MILLIGRAM(S): at 06:10

## 2019-12-03 RX ADMIN — Medication 6 MILLIGRAM(S): at 00:25

## 2019-12-03 RX ADMIN — Medication 50 MILLIGRAM(S): at 06:10

## 2019-12-03 NOTE — DISCHARGE NOTE PROVIDER - NSDCMRMEDTOKEN_GEN_ALL_CORE_FT
furosemide 40 mg oral tablet: 1 tab(s) orally once a day  metOLazone 2.5 mg oral tablet: 1 tab(s) orally 3 times a week on Monday, Wednesday, and Friday  Metoprolol Succinate ER 50 mg oral tablet, extended release: 1 tab(s) orally once a day  mirtazapine 7.5 mg oral tablet: 1 tab(s) orally once a day (at bedtime)  Multiple Vitamins oral tablet: 1 tab(s) orally once a day  rivaroxaban 15 mg oral tablet: 1 tab(s) orally once a day (in the evening)  traZODone 100 mg oral tablet: 1 tab(s) orally once a day (at bedtime)  Vitamin C 500 mg oral tablet: 1 tab(s) orally once a day furosemide 40 mg oral tablet: 1 tab(s) orally once a day  metOLazone 2.5 mg oral tablet: 1 tab(s) orally 3 times a week on Monday, Wednesday, and Friday  Metoprolol Succinate ER 50 mg oral tablet, extended release: 1 tab(s) orally once a day  Multiple Vitamins oral tablet: 1 tab(s) orally once a day  potassium chloride 20 mEq oral tablet, extended release: 2 tab(s) orally 2 times a day for 2 days.   rivaroxaban 15 mg oral tablet: 1 tab(s) orally once a day (in the evening)  traZODone 100 mg oral tablet: 1 tab(s) orally once a day (at bedtime)  Vitamin C 500 mg oral tablet: 1 tab(s) orally once a day K-Tab 20 mEq oral tablet, extended release: 1 tab(s) orally once a day take after the 40 meq supply finishes.   metOLazone 2.5 mg oral tablet: 1 tab(s) orally 3 times a week on Monday, Wednesday, and Friday  Metoprolol Succinate ER 50 mg oral tablet, extended release: 1 tab(s) orally once a day  Multiple Vitamins oral tablet: 1 tab(s) orally once a day  potassium chloride 20 mEq oral tablet, extended release: 2 tab(s) orally 2 times a day for 2 days.   rivaroxaban 15 mg oral tablet: 1 tab(s) orally once a day (in the evening)  torsemide 20 mg oral tablet: 2 tab(s) orally 2 times a day   traZODone 100 mg oral tablet: 1 tab(s) orally once a day (at bedtime)  Vitamin C 500 mg oral tablet: 1 tab(s) orally once a day

## 2019-12-03 NOTE — DISCHARGE NOTE PROVIDER - NSDCFUSCHEDAPPT_GEN_ALL_CORE_FT
SANGEETHA GARCIA ; 12/17/2019 ; [facility] SANGEETHA GARCIA ; 12/17/2019 ; [facility] SANGEETHA GARCIA ; 12/17/2019 ; [facility] SANGEETHA GARCIA ; 12/17/2019 ; [facility]

## 2019-12-03 NOTE — DISCHARGE NOTE PROVIDER - PROVIDER TOKENS
FREE:[LAST:[Follow-up],PHONE:[(   )    -],FAX:[(   )    -],ADDRESS:[with your Cardiologist this week.]]

## 2019-12-03 NOTE — DISCHARGE NOTE NURSING/CASE MANAGEMENT/SOCIAL WORK - PATIENT PORTAL LINK FT
You can access the FollowMyHealth Patient Portal offered by [facility] by registering at the following website: [email or web address]. By joining [email or web address] FollowMyHealth portal, you will also be able to view your health information using other applications (apps) compatible with our system.

## 2019-12-03 NOTE — DISCHARGE NOTE NURSING/CASE MANAGEMENT/SOCIAL WORK - NSDCPEXARELTO_GEN_ALL_CORE
Rivaroxaban/Xarelto - Dietary Advice/Rivaroxaban/Xarelto - Compliance/Rivaroxaban/Xarelto - Follow up monitoring/Rivaroxaban/Xarelto - Potential for adverse drug reactions and interactions

## 2019-12-03 NOTE — PROGRESS NOTE ADULT - ASSESSMENT
89 year old male PMH AF on Xarelto, PPM, HTN, CHF, p/w worsening b/l pedal edema.     CKD stage 3/4  hx of >20 year HTN  UA blend  CKD likely HTN  renal function slightly worsen today  hypervolemic takes lasix 40 daily and metolazone 2.5 (MWF)  given lasix 80 iv 12/2 now on lasix 40mg bid iv.  patient insistent to go home today, is signing out AMA    consider discharge on torsemide 40mg bid, patient advised to follow up with cardiology and nephrology in 1-2 week for dose adjustment and conitor renal function. patient's daughter notified.      Avoid Nephrotoxins.    hypokalemia  sec to diuresing  supplemented  recommend daily kcl 20meq supplement  monitor    HTN  suboptimal  likely sec to hypervolemia  continue diuresing  monitor    LE edema  continue diuresing  recent echo with normal EF, mod AS  f/u cardio  monitor I/O daily weight    Alkalosis  better  monitor    anemia  check iron panel  monitor Hb

## 2019-12-03 NOTE — DISCHARGE NOTE PROVIDER - NSDCCPCAREPLAN_GEN_ALL_CORE_FT
PRINCIPAL DISCHARGE DIAGNOSIS  Diagnosis: Acute on chronic congestive heart failure, unspecified heart failure type  Assessment and Plan of Treatment: Continue Lasix, current medications.   Monitor your urine output, weight. Low sodium diet, restrict your fluids to 1200mL.  Follow-up with your Cardiologist this week.         SECONDARY DISCHARGE DIAGNOSES  Diagnosis: Atrial fibrillation, unspecified type  Assessment and Plan of Treatment: Atrial fibrillation, unspecified type  - Continue Xarelto.    Diagnosis: Essential hypertension  Assessment and Plan of Treatment: Essential hypertension  - Continue current medications.   - Monitor your blood pressure. PRINCIPAL DISCHARGE DIAGNOSIS  Diagnosis: Acute on chronic congestive heart failure, unspecified heart failure type  Assessment and Plan of Treatment: Continue Torsemide, current medications.   Monitor your urine output, weight. Low sodium diet, restrict your fluids to 1200mL.  Follow-up with your Cardiologist this week.         SECONDARY DISCHARGE DIAGNOSES  Diagnosis: Atrial fibrillation, unspecified type  Assessment and Plan of Treatment: Atrial fibrillation, unspecified type  - Continue Xarelto.    Diagnosis: Essential hypertension  Assessment and Plan of Treatment: Essential hypertension  - Continue current medications.   - Monitor your blood pressure.

## 2019-12-03 NOTE — PROVIDER CONTACT NOTE (OTHER) - RECOMMENDATIONS
Educated pt numerous times on the importance of Tele monitor, potassium supplementation and only getting up with assistance

## 2019-12-03 NOTE — DISCHARGE NOTE PROVIDER - HOSPITAL COURSE
89 y/o Male with PMHx of A-Fib (On Xarelto) , PPM, HTN, CHF presents with worsening B/L LE EDEMA. . Pt is being admitted for acute on chronic CHF  and hypokalemia         + CHF - diuresis w/ 40mg IVP Lasix BID , ECHO-p    + Hypokalemia - supplmented , Monitor K    + CKD - renal following , stable    + Atrial fibrillation On Xarelto and Metoprolol             1. Acute on chronic congestive heart failure, unspecified heart failure type.  Plan: - BNP:  1848 , In October was 1300    - Continue diuresis w/ 40mg IVP Lasix BID    - S/p 80mg IVP Lasix in ED    - monitor electrolytes     - daily weights     - strict I&Os    - fluid restriction     - low sodium intake     - TTE AM    - CXR - Small right pleural effusion with adjacent nonspecific right basilar     consolidative changes.    - Cardio c/s AM.         2: Hypokalemia.  Plan: - K 2.8    - s/p 40meq Potassium Chloride x1 PO    - s/p 10meq IVP Potassium Chloride 3 runs of IV    - Monitor.         3: CKD (chronic kidney disease).  Plan: - hx of >20 year HTN    - renal function has been relatively stable since last admission.     - Monitor BMP daily w/ mg and phos     - Monitor Cr    - avoid nephrotoxic agents     - Renal c/s appreciated.         4 Atrial fibrillation, unspecified type.  Plan: - CHAD2-VASC: 4    - EKG: V-Paced@ 68 TWI AvL , V1 Qtc 582     - continue AC w/ Xarelto    - f/u coags daily     - continue rate control w/ Metoprolol     - no caffeine.         5 Essential hypertension.  Plan: - Continue Home BP meds w/ parameters     - monitor BP     - DASH/TLC diet.         6 Pacemaker. Plan: - E/P c/s AM for interrogation    - Monitor.        7 DVT prophylaxis.  Plan: DVT ppx: Already anticoagulated on Xarelto    PT c/s.         Pt is adamant he wants to leave the hospital. 89 y/o Male with PMHx of A-Fib (On Xarelto) , PPM, HTN, CHF presents with worsening B/L LE EDEMA. . Pt is being admitted for acute on chronic CHF  and hypokalemia         + CHF - diuresis w/ 40mg IVP Lasix BID , ECHO-p    + Hypokalemia - supplmented , Monitor K    + CKD - renal following , stable    + Atrial fibrillation On Xarelto and Metoprolol             1. Acute on chronic congestive heart failure, unspecified heart failure type.  Plan: - BNP:  1848 , In October was 1300    - Continue diuresis w/ 40mg IVP Lasix BID    - S/p 80mg IVP Lasix in ED    - monitor electrolytes     - daily weights     - strict I&Os    - fluid restriction     - low sodium intake     - TTE AM    - CXR - Small right pleural effusion with adjacent nonspecific right basilar     consolidative changes.    - Cardio c/s AM.         2: Hypokalemia.  Plan: - K 2.8    - s/p 40meq Potassium Chloride x1 PO    - s/p 10meq IVP Potassium Chloride 3 runs of IV    - Monitor.         3: CKD (chronic kidney disease).  Plan: - hx of >20 year HTN    - renal function has been relatively stable since last admission.     - Monitor BMP daily w/ mg and phos     - Monitor Cr    - avoid nephrotoxic agents     - Renal c/s appreciated.         4 Atrial fibrillation, unspecified type.  Plan: - CHAD2-VASC: 4    - EKG: V-Paced@ 68 TWI AvL , V1 Qtc 582     - continue AC w/ Xarelto    - f/u coags daily     - continue rate control w/ Metoprolol     - no caffeine.         5 Essential hypertension.  Plan: - Continue Home BP meds w/ parameters     - monitor BP     - DASH/TLC diet.         6 Pacemaker. Plan: - E/P c/s AM for interrogation    - Monitor.        7 DVT prophylaxis.  Plan: DVT ppx: Already anticoagulated on Xarelto    PT c/s.         Pt is adamant he wants to leave the hospital. Patient wishes to leave the hospital against medical advice. Patient is A&O x 3 and has full capacity to make his own medical decisions. Pt was informed of their medical conditions, benefits, and alternatives to treatment as well as the risks of refusing treatment and the seriousness of leaving against medical advice such as the risks of heart attack, stroke, seizure, and even death were fully explained to the patient.  After expressing full understanding, patient then signed out against medical advice witnessed by the nurse.  Dr. Stapleton made aware. Pt also aware of his low potassium and the risks of not taking potassium. Pt states he will take it in his own pharmacy. 89 y/o Male with PMHx of A-Fib (On Xarelto) , PPM, HTN, CHF presents with worsening B/L LE EDEMA. . Pt is being admitted for acute on chronic CHF  and hypokalemia         + CHF - diuresis w/ 40mg IVP Lasix BID , ECHO-p    + Hypokalemia - supplmented , Monitor K    + CKD - renal following , stable    + Atrial fibrillation On Xarelto and Metoprolol             1. Acute on chronic congestive heart failure, unspecified heart failure type.  Plan: - BNP:  1848 , In October was 1300    - Continue diuresis w/ 40mg IVP Lasix BID    - S/p 80mg IVP Lasix in ED    - monitor electrolytes     - daily weights     - strict I&Os    - fluid restriction     - low sodium intake     - TTE AM    - CXR - Small right pleural effusion with adjacent nonspecific right basilar     consolidative changes.    - Cardio c/s AM.         2: Hypokalemia.  Plan: - K 2.8    - s/p 40meq Potassium Chloride x1 PO    - s/p 10meq IVP Potassium Chloride 3 runs of IV    - Monitor.         3: CKD (chronic kidney disease).  Plan: - hx of >20 year HTN    - renal function has been relatively stable since last admission.     - Monitor BMP daily w/ mg and phos     - Monitor Cr    - avoid nephrotoxic agents     - Renal c/s appreciated.         4 Atrial fibrillation, unspecified type.  Plan: - CHAD2-VASC: 4    - EKG: V-Paced@ 68 TWI AvL , V1 Qtc 582     - continue AC w/ Xarelto    - f/u coags daily     - continue rate control w/ Metoprolol     - no caffeine.         5 Essential hypertension.  Plan: - Continue Home BP meds w/ parameters     - monitor BP     - DASH/TLC diet.         6 Pacemaker. Plan: - E/P c/s AM for interrogation    - Monitor.        7 DVT prophylaxis.  Plan: DVT ppx: Already anticoagulated on Xarelto    PT c/s.         Pt is adamant he wants to leave the hospital. Patient wishes to leave the hospital against medical advice. Patient is A&O x 3 and has full capacity to make his own medical decisions. Pt was informed of his medical conditions, benefits, and alternatives to treatment as well as the risks of refusing treatment and the seriousness of leaving against medical advice such as the risks of heart attack, stroke, seizure, and even death were fully explained to the patient.  After expressing full understanding, patient then signed out against medical advice witnessed by the nurse.  Dr. Stapleton made aware. Pt also aware of his low potassium and the risks of not taking potassium such as arrythmia and death. Pt states he will take it in his own pharmacy. Daughter at bedside with pt.

## 2019-12-03 NOTE — PROGRESS NOTE ADULT - SUBJECTIVE AND OBJECTIVE BOX
[facility]   MD BETH DIXON, DO SONNY RYAN, AJIT JOHNSON    TEL:  OFFICE: [phone]  DR TALAMANTES CELL: [phone]  DR. CUELLAR CELL: [phone]  DR. RYAN CELL: [phone]  EMMANUEL CASTRO CELL: [phone]        Patient is a 89y old  Male who presents with a chief complaint of Edema (03 Dec 2019 11:37)      Patient seen and examined at bedside. No chest pain/sob    VITALS:  T(F): 97.5 (12-03-19 @ 10:49), Max: 98.3 (12-02-19 @ 19:09)  HR: 67 (12-03-19 @ 10:49)  BP: 145/52 (12-03-19 @ 10:49)  RR: 19 (12-03-19 @ 10:49)  SpO2: 98% (12-03-19 @ 10:49)  Wt(kg): --    12-02 @ 07:01  -  12-03 @ 07:00  --------------------------------------------------------  IN: 250 mL / OUT: 650 mL / NET: -400 mL          PHYSICAL EXAM:  Constitutional: NAD  Neck: No JVD  Respiratory: CTAB, no wheezes, rales or rhonchi  Cardiovascular: S1, S2, RRR  Gastrointestinal: BS+, soft, NT/ND  Extremities: 3+ peripheral edema b/l    Hospital Medications:   MEDICATIONS  (STANDING):  furosemide   Injectable 40 milliGRAM(s) IV Push two times a day  metolazone 2.5 milliGRAM(s) Oral <User Schedule>  metoprolol succinate ER 50 milliGRAM(s) Oral daily  multivitamin 1 Tablet(s) Oral daily  rivaroxaban 15 milliGRAM(s) Oral with dinner  traZODone 100 milliGRAM(s) Oral at bedtime      LABS:  12-03    131<L>  |  85<L>  |  61<H>  ----------------------------<  122<H>  2.6<LL>   |  29  |  2.43<H>    Ca    9.2      03 Dec 2019 06:05  Phos  3.8     12-03  Mg     2.1     12-03    TPro  7.8  /  Alb  4.3  /  TBili  0.7  /  DBili      /  AST  45<H>  /  ALT  31  /  AlkPhos  57  12-02    Creatinine Trend: 2.43 <--, 2.38 <--    Phosphorus Level, Serum: 3.8 mg/dL (12-03 @ 06:05)                              8.7    12.60 )-----------( 316      ( 03 Dec 2019 06:05 )             27.5     Urine Studies:      HbA1c 5.9      [10-13-19 @ 06:50]  TSH 2.53      [12-03-19 @ 06:05]  Lipid: chol 81, TG 66, HDL 49, LDL 29      [12-03-19 @ 06:05]        RADIOLOGY & ADDITIONAL STUDIES:

## 2019-12-17 ENCOUNTER — APPOINTMENT (OUTPATIENT)
Dept: PSYCHIATRY | Facility: CLINIC | Age: 84
End: 2019-12-17

## 2019-12-24 DIAGNOSIS — F43.21 ADJUSTMENT DISORDER WITH DEPRESSED MOOD: ICD-10-CM

## 2020-11-06 NOTE — ED PROVIDER NOTE - NS ED ROS FT
No
GENERAL: no fever, no chills  EYES: no change in vision  HEENT: no trouble swallowing or speaking  CARDIAC: no chest pain, no palpitations, +pedal edema  PULMONARY: no cough, no shortness of breath, no wheezing, no ALFONSO, no orthopnea  GI: no abdominal pain, no nausea, no vomiting, no diarrhea, no constipation  : no changes in urination  SKIN: no rashes  NEURO: no headache, no numbness, no weakness  MSK: no joint pain, no muscle pain, no back pain, no calf pain     -French Turner, PGY-1

## 2022-03-25 NOTE — PATIENT PROFILE ADULT - FALL HARM RISK TYPE OF ASSESSMENT
Patient medicated per MAR, tolerated well.  PIV removed, catheter intact. Discharge education provided. Discharge paperwork signed by pt. All questions answered. All belongings with pt. Pt ambulated to lobby unassisted with steady gait.   admission